# Patient Record
Sex: MALE | Race: WHITE | NOT HISPANIC OR LATINO | Employment: FULL TIME | URBAN - METROPOLITAN AREA
[De-identification: names, ages, dates, MRNs, and addresses within clinical notes are randomized per-mention and may not be internally consistent; named-entity substitution may affect disease eponyms.]

---

## 2017-01-07 ENCOUNTER — COMMUNICATION - HEALTHEAST (OUTPATIENT)
Dept: FAMILY MEDICINE | Facility: CLINIC | Age: 37
End: 2017-01-07

## 2017-01-07 DIAGNOSIS — F41.0 PANIC ATTACK: ICD-10-CM

## 2017-01-16 ENCOUNTER — COMMUNICATION - HEALTHEAST (OUTPATIENT)
Dept: FAMILY MEDICINE | Facility: CLINIC | Age: 37
End: 2017-01-16

## 2017-01-16 DIAGNOSIS — F90.0 ADHD, PREDOMINANTLY INATTENTIVE TYPE: ICD-10-CM

## 2017-02-13 ENCOUNTER — COMMUNICATION - HEALTHEAST (OUTPATIENT)
Dept: FAMILY MEDICINE | Facility: CLINIC | Age: 37
End: 2017-02-13

## 2017-02-13 DIAGNOSIS — F90.0 ADHD, PREDOMINANTLY INATTENTIVE TYPE: ICD-10-CM

## 2017-03-06 ENCOUNTER — COMMUNICATION - HEALTHEAST (OUTPATIENT)
Dept: FAMILY MEDICINE | Facility: CLINIC | Age: 37
End: 2017-03-06

## 2017-03-06 DIAGNOSIS — F41.0 PANIC ATTACK: ICD-10-CM

## 2017-03-08 ENCOUNTER — COMMUNICATION - HEALTHEAST (OUTPATIENT)
Dept: FAMILY MEDICINE | Facility: CLINIC | Age: 37
End: 2017-03-08

## 2017-03-08 DIAGNOSIS — F41.0 PANIC ATTACK: ICD-10-CM

## 2017-03-08 DIAGNOSIS — F90.0 ADHD, PREDOMINANTLY INATTENTIVE TYPE: ICD-10-CM

## 2017-04-03 ENCOUNTER — COMMUNICATION - HEALTHEAST (OUTPATIENT)
Dept: FAMILY MEDICINE | Facility: CLINIC | Age: 37
End: 2017-04-03

## 2017-04-03 DIAGNOSIS — F90.0 ADHD, PREDOMINANTLY INATTENTIVE TYPE: ICD-10-CM

## 2017-04-03 DIAGNOSIS — F41.0 PANIC ATTACK: ICD-10-CM

## 2017-05-05 ENCOUNTER — COMMUNICATION - HEALTHEAST (OUTPATIENT)
Dept: FAMILY MEDICINE | Facility: CLINIC | Age: 37
End: 2017-05-05

## 2017-05-05 DIAGNOSIS — F90.0 ADHD, PREDOMINANTLY INATTENTIVE TYPE: ICD-10-CM

## 2017-05-31 ENCOUNTER — COMMUNICATION - HEALTHEAST (OUTPATIENT)
Dept: FAMILY MEDICINE | Facility: CLINIC | Age: 37
End: 2017-05-31

## 2017-05-31 DIAGNOSIS — F41.0 PANIC ATTACK: ICD-10-CM

## 2017-05-31 DIAGNOSIS — F90.0 ADHD, PREDOMINANTLY INATTENTIVE TYPE: ICD-10-CM

## 2017-06-03 ENCOUNTER — COMMUNICATION - HEALTHEAST (OUTPATIENT)
Dept: SCHEDULING | Facility: CLINIC | Age: 37
End: 2017-06-03

## 2017-06-03 DIAGNOSIS — F41.0 PANIC ATTACK: ICD-10-CM

## 2017-06-06 ENCOUNTER — OFFICE VISIT - HEALTHEAST (OUTPATIENT)
Dept: FAMILY MEDICINE | Facility: CLINIC | Age: 37
End: 2017-06-06

## 2017-06-06 DIAGNOSIS — F90.0 ADHD, PREDOMINANTLY INATTENTIVE TYPE: ICD-10-CM

## 2017-06-06 ASSESSMENT — MIFFLIN-ST. JEOR: SCORE: 1751.8

## 2017-06-14 ENCOUNTER — COMMUNICATION - HEALTHEAST (OUTPATIENT)
Dept: FAMILY MEDICINE | Facility: CLINIC | Age: 37
End: 2017-06-14

## 2017-06-20 ENCOUNTER — AMBULATORY - HEALTHEAST (OUTPATIENT)
Dept: FAMILY MEDICINE | Facility: CLINIC | Age: 37
End: 2017-06-20

## 2017-06-20 DIAGNOSIS — Z79.899 HIGH RISK MEDICATION USE: ICD-10-CM

## 2017-07-03 ENCOUNTER — COMMUNICATION - HEALTHEAST (OUTPATIENT)
Dept: FAMILY MEDICINE | Facility: CLINIC | Age: 37
End: 2017-07-03

## 2017-07-03 DIAGNOSIS — F41.0 PANIC ATTACK: ICD-10-CM

## 2017-08-22 ENCOUNTER — COMMUNICATION - HEALTHEAST (OUTPATIENT)
Dept: FAMILY MEDICINE | Facility: CLINIC | Age: 37
End: 2017-08-22

## 2017-08-22 DIAGNOSIS — J30.9 ALLERGIC RHINITIS: ICD-10-CM

## 2017-09-06 ENCOUNTER — COMMUNICATION - HEALTHEAST (OUTPATIENT)
Dept: SCHEDULING | Facility: CLINIC | Age: 37
End: 2017-09-06

## 2017-09-06 DIAGNOSIS — F90.0 ADHD, PREDOMINANTLY INATTENTIVE TYPE: ICD-10-CM

## 2017-12-12 ENCOUNTER — COMMUNICATION - HEALTHEAST (OUTPATIENT)
Dept: SCHEDULING | Facility: CLINIC | Age: 37
End: 2017-12-12

## 2017-12-12 DIAGNOSIS — F90.0 ADHD, PREDOMINANTLY INATTENTIVE TYPE: ICD-10-CM

## 2018-01-07 ENCOUNTER — COMMUNICATION - HEALTHEAST (OUTPATIENT)
Dept: FAMILY MEDICINE | Facility: CLINIC | Age: 38
End: 2018-01-07

## 2018-01-07 DIAGNOSIS — F90.0 ADHD, PREDOMINANTLY INATTENTIVE TYPE: ICD-10-CM

## 2018-02-06 ENCOUNTER — COMMUNICATION - HEALTHEAST (OUTPATIENT)
Dept: FAMILY MEDICINE | Facility: CLINIC | Age: 38
End: 2018-02-06

## 2018-02-06 DIAGNOSIS — F90.0 ADHD, PREDOMINANTLY INATTENTIVE TYPE: ICD-10-CM

## 2018-03-08 ENCOUNTER — COMMUNICATION - HEALTHEAST (OUTPATIENT)
Dept: FAMILY MEDICINE | Facility: CLINIC | Age: 38
End: 2018-03-08

## 2018-03-08 DIAGNOSIS — J30.9 ALLERGIC RHINITIS: ICD-10-CM

## 2018-03-08 DIAGNOSIS — F90.0 ADHD, PREDOMINANTLY INATTENTIVE TYPE: ICD-10-CM

## 2018-03-12 ENCOUNTER — COMMUNICATION - HEALTHEAST (OUTPATIENT)
Dept: FAMILY MEDICINE | Facility: CLINIC | Age: 38
End: 2018-03-12

## 2018-03-13 ENCOUNTER — COMMUNICATION - HEALTHEAST (OUTPATIENT)
Dept: SCHEDULING | Facility: CLINIC | Age: 38
End: 2018-03-13

## 2018-03-13 DIAGNOSIS — F90.0 ADHD, PREDOMINANTLY INATTENTIVE TYPE: ICD-10-CM

## 2018-03-28 ENCOUNTER — OFFICE VISIT - HEALTHEAST (OUTPATIENT)
Dept: FAMILY MEDICINE | Facility: CLINIC | Age: 38
End: 2018-03-28

## 2018-03-28 DIAGNOSIS — J30.9 ALLERGIC RHINITIS: ICD-10-CM

## 2018-03-28 DIAGNOSIS — F41.9 ANXIETY: ICD-10-CM

## 2018-03-28 DIAGNOSIS — F90.0 ADHD, PREDOMINANTLY INATTENTIVE TYPE: ICD-10-CM

## 2018-03-28 ASSESSMENT — MIFFLIN-ST. JEOR: SCORE: 1783.55

## 2018-04-04 ENCOUNTER — COMMUNICATION - HEALTHEAST (OUTPATIENT)
Dept: FAMILY MEDICINE | Facility: CLINIC | Age: 38
End: 2018-04-04

## 2018-04-04 DIAGNOSIS — J30.9 ALLERGIC RHINITIS: ICD-10-CM

## 2018-04-04 DIAGNOSIS — F90.0 ADHD, PREDOMINANTLY INATTENTIVE TYPE: ICD-10-CM

## 2018-04-05 ENCOUNTER — AMBULATORY - HEALTHEAST (OUTPATIENT)
Dept: FAMILY MEDICINE | Facility: CLINIC | Age: 38
End: 2018-04-05

## 2018-04-05 DIAGNOSIS — J30.9 ALLERGIC RHINITIS: ICD-10-CM

## 2018-05-03 ENCOUNTER — COMMUNICATION - HEALTHEAST (OUTPATIENT)
Dept: FAMILY MEDICINE | Facility: CLINIC | Age: 38
End: 2018-05-03

## 2018-05-03 DIAGNOSIS — F90.0 ADHD, PREDOMINANTLY INATTENTIVE TYPE: ICD-10-CM

## 2018-05-03 DIAGNOSIS — J30.9 ALLERGIC RHINITIS: ICD-10-CM

## 2018-06-06 ENCOUNTER — COMMUNICATION - HEALTHEAST (OUTPATIENT)
Dept: FAMILY MEDICINE | Facility: CLINIC | Age: 38
End: 2018-06-06

## 2018-06-06 DIAGNOSIS — F90.0 ADHD, PREDOMINANTLY INATTENTIVE TYPE: ICD-10-CM

## 2018-06-08 ENCOUNTER — COMMUNICATION - HEALTHEAST (OUTPATIENT)
Dept: FAMILY MEDICINE | Facility: CLINIC | Age: 38
End: 2018-06-08

## 2018-06-08 DIAGNOSIS — F90.0 ADHD, PREDOMINANTLY INATTENTIVE TYPE: ICD-10-CM

## 2018-06-08 DIAGNOSIS — F41.0 PANIC ATTACK: ICD-10-CM

## 2018-07-05 ENCOUNTER — COMMUNICATION - HEALTHEAST (OUTPATIENT)
Dept: FAMILY MEDICINE | Facility: CLINIC | Age: 38
End: 2018-07-05

## 2018-07-05 DIAGNOSIS — F90.0 ADHD, PREDOMINANTLY INATTENTIVE TYPE: ICD-10-CM

## 2018-08-06 ENCOUNTER — COMMUNICATION - HEALTHEAST (OUTPATIENT)
Dept: FAMILY MEDICINE | Facility: CLINIC | Age: 38
End: 2018-08-06

## 2018-08-06 DIAGNOSIS — F90.0 ADHD, PREDOMINANTLY INATTENTIVE TYPE: ICD-10-CM

## 2018-08-31 ENCOUNTER — COMMUNICATION - HEALTHEAST (OUTPATIENT)
Dept: FAMILY MEDICINE | Facility: CLINIC | Age: 38
End: 2018-08-31

## 2018-08-31 DIAGNOSIS — F90.0 ADHD, PREDOMINANTLY INATTENTIVE TYPE: ICD-10-CM

## 2018-08-31 DIAGNOSIS — F41.0 PANIC ATTACK: ICD-10-CM

## 2018-10-03 ENCOUNTER — COMMUNICATION - HEALTHEAST (OUTPATIENT)
Dept: FAMILY MEDICINE | Facility: CLINIC | Age: 38
End: 2018-10-03

## 2018-10-03 DIAGNOSIS — F90.0 ADHD, PREDOMINANTLY INATTENTIVE TYPE: ICD-10-CM

## 2018-11-01 ENCOUNTER — COMMUNICATION - HEALTHEAST (OUTPATIENT)
Dept: FAMILY MEDICINE | Facility: CLINIC | Age: 38
End: 2018-11-01

## 2018-11-01 DIAGNOSIS — F90.0 ADHD, PREDOMINANTLY INATTENTIVE TYPE: ICD-10-CM

## 2018-11-13 ENCOUNTER — COMMUNICATION - HEALTHEAST (OUTPATIENT)
Dept: SCHEDULING | Facility: CLINIC | Age: 38
End: 2018-11-13

## 2018-11-13 ENCOUNTER — COMMUNICATION - HEALTHEAST (OUTPATIENT)
Dept: FAMILY MEDICINE | Facility: CLINIC | Age: 38
End: 2018-11-13

## 2018-11-26 ENCOUNTER — COMMUNICATION - HEALTHEAST (OUTPATIENT)
Dept: FAMILY MEDICINE | Facility: CLINIC | Age: 38
End: 2018-11-26

## 2018-11-26 DIAGNOSIS — F90.0 ADHD, PREDOMINANTLY INATTENTIVE TYPE: ICD-10-CM

## 2018-11-26 DIAGNOSIS — F41.0 PANIC ATTACK: ICD-10-CM

## 2018-12-03 ENCOUNTER — OFFICE VISIT - HEALTHEAST (OUTPATIENT)
Dept: FAMILY MEDICINE | Facility: CLINIC | Age: 38
End: 2018-12-03

## 2018-12-03 ENCOUNTER — COMMUNICATION - HEALTHEAST (OUTPATIENT)
Dept: FAMILY MEDICINE | Facility: CLINIC | Age: 38
End: 2018-12-03

## 2018-12-03 DIAGNOSIS — R03.0 ELEVATED BLOOD PRESSURE READING WITHOUT DIAGNOSIS OF HYPERTENSION: ICD-10-CM

## 2018-12-03 DIAGNOSIS — F90.0 ADHD, PREDOMINANTLY INATTENTIVE TYPE: ICD-10-CM

## 2018-12-03 ASSESSMENT — MIFFLIN-ST. JEOR: SCORE: 1779.01

## 2018-12-05 ENCOUNTER — COMMUNICATION - HEALTHEAST (OUTPATIENT)
Dept: FAMILY MEDICINE | Facility: CLINIC | Age: 38
End: 2018-12-05

## 2018-12-05 ENCOUNTER — AMBULATORY - HEALTHEAST (OUTPATIENT)
Dept: FAMILY MEDICINE | Facility: CLINIC | Age: 38
End: 2018-12-05

## 2018-12-05 DIAGNOSIS — F90.0 ADHD, PREDOMINANTLY INATTENTIVE TYPE: ICD-10-CM

## 2019-01-03 ENCOUNTER — COMMUNICATION - HEALTHEAST (OUTPATIENT)
Dept: FAMILY MEDICINE | Facility: CLINIC | Age: 39
End: 2019-01-03

## 2019-01-03 DIAGNOSIS — F90.0 ADHD, PREDOMINANTLY INATTENTIVE TYPE: ICD-10-CM

## 2019-01-04 ENCOUNTER — COMMUNICATION - HEALTHEAST (OUTPATIENT)
Dept: FAMILY MEDICINE | Facility: CLINIC | Age: 39
End: 2019-01-04

## 2019-01-04 DIAGNOSIS — F90.0 ADHD, PREDOMINANTLY INATTENTIVE TYPE: ICD-10-CM

## 2019-02-01 ENCOUNTER — COMMUNICATION - HEALTHEAST (OUTPATIENT)
Dept: FAMILY MEDICINE | Facility: CLINIC | Age: 39
End: 2019-02-01

## 2019-02-01 DIAGNOSIS — F90.0 ADHD, PREDOMINANTLY INATTENTIVE TYPE: ICD-10-CM

## 2019-02-04 ENCOUNTER — COMMUNICATION - HEALTHEAST (OUTPATIENT)
Dept: FAMILY MEDICINE | Facility: CLINIC | Age: 39
End: 2019-02-04

## 2019-02-04 DIAGNOSIS — F90.0 ADHD, PREDOMINANTLY INATTENTIVE TYPE: ICD-10-CM

## 2019-03-05 ENCOUNTER — COMMUNICATION - HEALTHEAST (OUTPATIENT)
Dept: FAMILY MEDICINE | Facility: CLINIC | Age: 39
End: 2019-03-05

## 2019-03-05 DIAGNOSIS — F41.0 PANIC ATTACK: ICD-10-CM

## 2019-03-05 DIAGNOSIS — F90.0 ADHD, PREDOMINANTLY INATTENTIVE TYPE: ICD-10-CM

## 2019-03-06 ENCOUNTER — COMMUNICATION - HEALTHEAST (OUTPATIENT)
Dept: FAMILY MEDICINE | Facility: CLINIC | Age: 39
End: 2019-03-06

## 2019-03-06 DIAGNOSIS — F90.0 ADHD, PREDOMINANTLY INATTENTIVE TYPE: ICD-10-CM

## 2019-03-06 DIAGNOSIS — F41.0 PANIC ATTACK: ICD-10-CM

## 2019-03-29 ENCOUNTER — OFFICE VISIT - HEALTHEAST (OUTPATIENT)
Dept: FAMILY MEDICINE | Facility: CLINIC | Age: 39
End: 2019-03-29

## 2019-03-29 DIAGNOSIS — F41.0 PANIC ATTACK: ICD-10-CM

## 2019-03-29 DIAGNOSIS — F90.0 ADHD, PREDOMINANTLY INATTENTIVE TYPE: ICD-10-CM

## 2019-03-29 DIAGNOSIS — R03.0 ELEVATED BLOOD PRESSURE READING WITHOUT DIAGNOSIS OF HYPERTENSION: ICD-10-CM

## 2019-03-29 ASSESSMENT — MIFFLIN-ST. JEOR: SCORE: 1773.57

## 2019-04-25 ENCOUNTER — COMMUNICATION - HEALTHEAST (OUTPATIENT)
Dept: FAMILY MEDICINE | Facility: CLINIC | Age: 39
End: 2019-04-25

## 2019-04-25 DIAGNOSIS — F90.0 ADHD, PREDOMINANTLY INATTENTIVE TYPE: ICD-10-CM

## 2019-04-29 ENCOUNTER — COMMUNICATION - HEALTHEAST (OUTPATIENT)
Dept: FAMILY MEDICINE | Facility: CLINIC | Age: 39
End: 2019-04-29

## 2019-05-28 ENCOUNTER — COMMUNICATION - HEALTHEAST (OUTPATIENT)
Dept: FAMILY MEDICINE | Facility: CLINIC | Age: 39
End: 2019-05-28

## 2019-05-28 DIAGNOSIS — F90.0 ADHD, PREDOMINANTLY INATTENTIVE TYPE: ICD-10-CM

## 2019-06-24 ENCOUNTER — COMMUNICATION - HEALTHEAST (OUTPATIENT)
Dept: FAMILY MEDICINE | Facility: CLINIC | Age: 39
End: 2019-06-24

## 2019-06-24 DIAGNOSIS — F90.0 ADHD, PREDOMINANTLY INATTENTIVE TYPE: ICD-10-CM

## 2019-07-24 ENCOUNTER — COMMUNICATION - HEALTHEAST (OUTPATIENT)
Dept: FAMILY MEDICINE | Facility: CLINIC | Age: 39
End: 2019-07-24

## 2019-07-24 DIAGNOSIS — F90.0 ADHD, PREDOMINANTLY INATTENTIVE TYPE: ICD-10-CM

## 2019-07-26 ENCOUNTER — COMMUNICATION - HEALTHEAST (OUTPATIENT)
Dept: FAMILY MEDICINE | Facility: CLINIC | Age: 39
End: 2019-07-26

## 2019-08-21 ENCOUNTER — COMMUNICATION - HEALTHEAST (OUTPATIENT)
Dept: FAMILY MEDICINE | Facility: CLINIC | Age: 39
End: 2019-08-21

## 2019-08-21 DIAGNOSIS — F90.0 ADHD, PREDOMINANTLY INATTENTIVE TYPE: ICD-10-CM

## 2019-09-23 ENCOUNTER — COMMUNICATION - HEALTHEAST (OUTPATIENT)
Dept: FAMILY MEDICINE | Facility: CLINIC | Age: 39
End: 2019-09-23

## 2019-09-23 DIAGNOSIS — F90.0 ADHD, PREDOMINANTLY INATTENTIVE TYPE: ICD-10-CM

## 2019-09-25 ENCOUNTER — COMMUNICATION - HEALTHEAST (OUTPATIENT)
Dept: FAMILY MEDICINE | Facility: CLINIC | Age: 39
End: 2019-09-25

## 2019-10-28 ENCOUNTER — OFFICE VISIT - HEALTHEAST (OUTPATIENT)
Dept: FAMILY MEDICINE | Facility: CLINIC | Age: 39
End: 2019-10-28

## 2019-10-28 DIAGNOSIS — F90.0 ADHD, PREDOMINANTLY INATTENTIVE TYPE: ICD-10-CM

## 2019-10-28 DIAGNOSIS — F41.9 ANXIETY: ICD-10-CM

## 2019-10-28 ASSESSMENT — MIFFLIN-ST. JEOR: SCORE: 1769.94

## 2019-11-07 ENCOUNTER — HEALTH MAINTENANCE LETTER (OUTPATIENT)
Age: 39
End: 2019-11-07

## 2019-11-26 ENCOUNTER — COMMUNICATION - HEALTHEAST (OUTPATIENT)
Dept: FAMILY MEDICINE | Facility: CLINIC | Age: 39
End: 2019-11-26

## 2019-11-26 DIAGNOSIS — F90.0 ADHD, PREDOMINANTLY INATTENTIVE TYPE: ICD-10-CM

## 2019-12-30 ENCOUNTER — COMMUNICATION - HEALTHEAST (OUTPATIENT)
Dept: FAMILY MEDICINE | Facility: CLINIC | Age: 39
End: 2019-12-30

## 2019-12-30 DIAGNOSIS — F90.0 ADHD, PREDOMINANTLY INATTENTIVE TYPE: ICD-10-CM

## 2020-01-27 ENCOUNTER — COMMUNICATION - HEALTHEAST (OUTPATIENT)
Dept: FAMILY MEDICINE | Facility: CLINIC | Age: 40
End: 2020-01-27

## 2020-01-27 DIAGNOSIS — F90.0 ADHD, PREDOMINANTLY INATTENTIVE TYPE: ICD-10-CM

## 2020-02-24 ENCOUNTER — COMMUNICATION - HEALTHEAST (OUTPATIENT)
Dept: FAMILY MEDICINE | Facility: CLINIC | Age: 40
End: 2020-02-24

## 2020-02-24 DIAGNOSIS — F90.0 ADHD, PREDOMINANTLY INATTENTIVE TYPE: ICD-10-CM

## 2020-03-20 ENCOUNTER — COMMUNICATION - HEALTHEAST (OUTPATIENT)
Dept: FAMILY MEDICINE | Facility: CLINIC | Age: 40
End: 2020-03-20

## 2020-03-20 DIAGNOSIS — F90.0 ADHD, PREDOMINANTLY INATTENTIVE TYPE: ICD-10-CM

## 2020-04-02 ENCOUNTER — COMMUNICATION - HEALTHEAST (OUTPATIENT)
Dept: FAMILY MEDICINE | Facility: CLINIC | Age: 40
End: 2020-04-02

## 2020-04-02 DIAGNOSIS — F90.0 ADHD, PREDOMINANTLY INATTENTIVE TYPE: ICD-10-CM

## 2020-05-07 ENCOUNTER — COMMUNICATION - HEALTHEAST (OUTPATIENT)
Dept: FAMILY MEDICINE | Facility: CLINIC | Age: 40
End: 2020-05-07

## 2020-05-07 DIAGNOSIS — F90.0 ADHD, PREDOMINANTLY INATTENTIVE TYPE: ICD-10-CM

## 2020-05-11 ENCOUNTER — OFFICE VISIT - HEALTHEAST (OUTPATIENT)
Dept: FAMILY MEDICINE | Facility: CLINIC | Age: 40
End: 2020-05-11

## 2020-05-11 ENCOUNTER — COMMUNICATION - HEALTHEAST (OUTPATIENT)
Dept: FAMILY MEDICINE | Facility: CLINIC | Age: 40
End: 2020-05-11

## 2020-05-11 DIAGNOSIS — J30.9 ALLERGIC RHINITIS: ICD-10-CM

## 2020-05-11 DIAGNOSIS — F41.9 ANXIETY: ICD-10-CM

## 2020-05-11 DIAGNOSIS — F90.0 ADHD, PREDOMINANTLY INATTENTIVE TYPE: ICD-10-CM

## 2020-06-09 ENCOUNTER — COMMUNICATION - HEALTHEAST (OUTPATIENT)
Dept: FAMILY MEDICINE | Facility: CLINIC | Age: 40
End: 2020-06-09

## 2020-06-09 DIAGNOSIS — F90.0 ADHD, PREDOMINANTLY INATTENTIVE TYPE: ICD-10-CM

## 2020-07-10 ENCOUNTER — COMMUNICATION - HEALTHEAST (OUTPATIENT)
Dept: FAMILY MEDICINE | Facility: CLINIC | Age: 40
End: 2020-07-10

## 2020-07-10 DIAGNOSIS — F90.0 ADHD, PREDOMINANTLY INATTENTIVE TYPE: ICD-10-CM

## 2020-08-13 ENCOUNTER — COMMUNICATION - HEALTHEAST (OUTPATIENT)
Dept: FAMILY MEDICINE | Facility: CLINIC | Age: 40
End: 2020-08-13

## 2020-08-13 DIAGNOSIS — F90.0 ADHD, PREDOMINANTLY INATTENTIVE TYPE: ICD-10-CM

## 2020-09-14 ENCOUNTER — COMMUNICATION - HEALTHEAST (OUTPATIENT)
Dept: FAMILY MEDICINE | Facility: CLINIC | Age: 40
End: 2020-09-14

## 2020-09-14 DIAGNOSIS — F90.0 ADHD, PREDOMINANTLY INATTENTIVE TYPE: ICD-10-CM

## 2020-10-19 ENCOUNTER — COMMUNICATION - HEALTHEAST (OUTPATIENT)
Dept: FAMILY MEDICINE | Facility: CLINIC | Age: 40
End: 2020-10-19

## 2020-10-19 DIAGNOSIS — F90.0 ADHD, PREDOMINANTLY INATTENTIVE TYPE: ICD-10-CM

## 2020-11-16 ENCOUNTER — COMMUNICATION - HEALTHEAST (OUTPATIENT)
Dept: FAMILY MEDICINE | Facility: CLINIC | Age: 40
End: 2020-11-16

## 2020-11-16 DIAGNOSIS — F90.0 ADHD, PREDOMINANTLY INATTENTIVE TYPE: ICD-10-CM

## 2020-11-29 ENCOUNTER — HEALTH MAINTENANCE LETTER (OUTPATIENT)
Age: 40
End: 2020-11-29

## 2020-12-30 ENCOUNTER — COMMUNICATION - HEALTHEAST (OUTPATIENT)
Dept: FAMILY MEDICINE | Facility: CLINIC | Age: 40
End: 2020-12-30

## 2020-12-30 DIAGNOSIS — F90.0 ADHD, PREDOMINANTLY INATTENTIVE TYPE: ICD-10-CM

## 2021-01-04 ENCOUNTER — OFFICE VISIT - HEALTHEAST (OUTPATIENT)
Dept: FAMILY MEDICINE | Facility: CLINIC | Age: 41
End: 2021-01-04

## 2021-01-04 DIAGNOSIS — I10 ESSENTIAL HYPERTENSION: ICD-10-CM

## 2021-01-04 DIAGNOSIS — F90.0 ADHD, PREDOMINANTLY INATTENTIVE TYPE: ICD-10-CM

## 2021-01-04 DIAGNOSIS — G25.0 BENIGN FAMILIAL TREMOR: ICD-10-CM

## 2021-01-04 ASSESSMENT — MIFFLIN-ST. JEOR: SCORE: 1774.48

## 2021-01-30 ENCOUNTER — COMMUNICATION - HEALTHEAST (OUTPATIENT)
Dept: FAMILY MEDICINE | Facility: CLINIC | Age: 41
End: 2021-01-30

## 2021-01-30 DIAGNOSIS — I10 ESSENTIAL HYPERTENSION: ICD-10-CM

## 2021-04-05 ENCOUNTER — COMMUNICATION - HEALTHEAST (OUTPATIENT)
Dept: FAMILY MEDICINE | Facility: CLINIC | Age: 41
End: 2021-04-05

## 2021-04-05 DIAGNOSIS — F90.0 ADHD, PREDOMINANTLY INATTENTIVE TYPE: ICD-10-CM

## 2021-05-27 NOTE — PATIENT INSTRUCTIONS - HE
Check blood pressure outside clinic and call with 5-10 readings in a few weeks.    DASH diet    Consider magnesium / co-q 10 supplements    Consider low sodium diet    Limit alcohol    Return for fasting labs

## 2021-05-27 NOTE — PROGRESS NOTES
"SUBJECTIVE: Claudio Thacker is a 38 y.o. male with:  Chief Complaint   Patient presents with     Medication check    1) ADD- He is taking Adderall XR 20 mg daily.  Helps with concentration at work.  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  Likes his job but very busy.  Takes med daily.  No side effects.  No loss appetite/ sleep issues.  Would like to remain on this med.    2) Anxiety/ panic- No panic attacks for awhile.  His overall anxiety is well controlled on Prozac.  No side effects on med.  He had premature son born in last year so that was stressful but he did not worry a lot.  Feels mood is doing well.  No depression.  Started running recently.    3) Elevated blood pressure- BP has been up off and on but always comes back down.  Has FH HTN.  He saw Dr. Peralta in December and had normal BP after high reading during episode sinusitis.  He feels his diet could be better.    SH:  with 2 children.  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    Patient Active Problem List   Diagnosis     Esophageal reflux     Allergic Rhinitis     ADHD, predominantly inattentive type     Panic attack     Anxiety     Benign familial tremor     Controlled substance agreement signed      OBJECTIVE: BP (!) 150/94   Pulse 86   Ht 5' 10\" (1.778 m)   Wt 186 lb 12.8 oz (84.7 kg)   BMI 26.80 kg/m   no distress  PSYCH:  Awake, alert, and oriented x 3.  Mood and affect are appropriate.  Good eye contact.  Speech is normal.  No suicidal ideation.  No hallucinations.  Neuro: Normal strength and tone.  Normal gait.      BP Readings from Last 3 Encounters:   03/29/19 140/90   12/03/18 128/80   03/28/18 134/86     Wt Readings from Last 3 Encounters:   03/29/19 186 lb 12.8 oz (84.7 kg)   12/03/18 188 lb (85.3 kg)   03/28/18 189 lb (85.7 kg)     Claudio was seen today for medication check.    Diagnoses and all orders for this visit:    Elevated blood pressure reading without diagnosis of hypertension- Will get outside BP readings- he " plans to buy a home BP cuff.  Trial of supplemetns/ diet/ lifestyle changes first but if consistently elevated despite that, start anti-hypertensive.  Check labs.  -     Lipid Cascade; Future  -     Comprehensive Metabolic Panel; Future  -     Magnesium, Red Blood Cell; Future    ADHD, predominantly inattentive type- Controlled on stimulant.  Controlled substance contract signed.  F/U in 6 months.  -     dextroamphetamine-amphetamine (ADDERALL XR) 20 MG 24 hr capsule; Take 1 capsule (20 mg total) by mouth every morning.    Panic attack- Stable on SSRI.  Continue medication for now.    -     FLUoxetine (PROZAC) 20 MG capsule; Take 1 capsule (20 mg total) by mouth daily.       Patient Instructions   Check blood pressure outside clinic and call with 5-10 readings in a few weeks.    DASH diet    Consider magnesium / co-q 10 supplements    Consider low sodium diet    Limit alcohol    Return for fasting labs       Catarina Mcfadden

## 2021-05-28 NOTE — TELEPHONE ENCOUNTER
Medication: dextroamphetamine-amphetamine (ADDERALL XR) 20 MG 24 hr capsule  Pharmacy: [facility]   Last OV: 03/29/19   Last Refill: 03/29/19 Q:30  Refills: 0    Please advise on refill.     KIRBY/JIHAN

## 2021-05-28 NOTE — TELEPHONE ENCOUNTER
Controlled Substance Refill Request  Medication:   Requested Prescriptions     Pending Prescriptions Disp Refills     dextroamphetamine-amphetamine (ADDERALL XR) 20 MG 24 hr capsule 30 capsule 0     Sig: Take 1 capsule (20 mg total) by mouth every morning.     Date Last Fill: 3/29/2019.  Pharmacy: [facility]  Submit electronically to pharmacy  Controlled Substance Agreement on File:   Encounter-Level CSA Scan Date - 03/28/2018:    Scan on 4/2/2018 11:14 AM: ADDERALL (below)             Encounter-Level CSA Scan Date - 04/25/2016:    Scan on 4/27/2016  8:07 AM: Adderall (below)         Last office visit: 3/29/2019. Last office visit pertaining to requested medication was 3/29/2019 with PCP Dr APARNA Mcfadden

## 2021-05-29 NOTE — TELEPHONE ENCOUNTER
Controlled Substance Refill Request  Medication:   Requested Prescriptions     Pending Prescriptions Disp Refills     dextroamphetamine-amphetamine (ADDERALL XR) 20 MG 24 hr capsule 30 capsule 0     Sig: Take 1 capsule (20 mg total) by mouth every morning.     Date Last Fill: 4/29/19  Pharmacy: [id number]   Submit electronically to pharmacy  Controlled Substance Agreement on File:   Encounter-Level CSA Scan Date - 03/28/2018:    Scan on 4/2/2018 11:14 AM: ADDERALL (below)             Encounter-Level CSA Scan Date - 04/25/2016:    Scan on 4/27/2016  8:07 AM: Adderall (below)         Last office visit: Last office visit pertaining to requested medication was 3/29/19.

## 2021-05-29 NOTE — TELEPHONE ENCOUNTER
Medication: dextroamphetamine-amphetamine (ADDERALL XR) 20 MG 24 hr capsule  Pharmacy: [facility], [place]   Last OV: 03/29/19  Last Refill: 04/29/19 Q:30 Refills: 0    Please advise on refill.     KIRBY/MAGDY

## 2021-05-30 NOTE — TELEPHONE ENCOUNTER
Medication: dextroamphetamine-amphetamine (ADDERALL XR) 20 MG 24 hr capsule  Pharmacy: [facility]   Last OV: 03/29/19   Last Refill: 05/29/19 Q: 30 Refills: 0     Please advise on refill.     KIRBY/MAGDY

## 2021-05-30 NOTE — TELEPHONE ENCOUNTER
Controlled Substance Refill Request  Medication:   Requested Prescriptions     Pending Prescriptions Disp Refills     dextroamphetamine-amphetamine (ADDERALL XR) 20 MG 24 hr capsule 30 capsule 0     Sig: Take 1 capsule (20 mg total) by mouth every morning.     Date Last Fill: 6/26/19  Pharmacy: [id number]   Submit electronically to pharmacy  Controlled Substance Agreement on File:   Encounter-Level CSA Scan Date - 03/28/2018:    Scan on 4/2/2018 11:14 AM: ADDERALL (below)             Encounter-Level CSA Scan Date - 04/25/2016:    Scan on 4/27/2016  8:07 AM: Adderall (below)         Last office visit: Last office visit pertaining to requested medication was 3/29/19.

## 2021-05-30 NOTE — TELEPHONE ENCOUNTER
Controlled Substance Refill Request  Medication:   Requested Prescriptions     Pending Prescriptions Disp Refills     dextroamphetamine-amphetamine (ADDERALL XR) 20 MG 24 hr capsule 30 capsule 0     Sig: Take 1 capsule (20 mg total) by mouth every morning.     Date Last Fill: 5/29/19  Pharmacy: [id number]   Submit electronically to pharmacy  Controlled Substance Agreement on File:   Encounter-Level CSA Scan Date - 03/28/2018:    Scan on 4/2/2018 11:14 AM: ADDERALL (below)             Encounter-Level CSA Scan Date - 04/25/2016:    Scan on 4/27/2016  8:07 AM: Adderall (below)         Last office visit: Last office visit pertaining to requested medication was 3/29/19.

## 2021-05-30 NOTE — TELEPHONE ENCOUNTER
Patient  is going out of town tomorrow requesting to process as soon as possible.   Who is calling:  Patient   Reason for Call:  Follow up on below refill medication . Patient states that he is going out of town tomorrow requesting to process as soon as possible.   Date of last appointment with primary care: 3/29/19  Okay to leave a detailed message: No

## 2021-05-31 VITALS — BODY MASS INDEX: 26.05 KG/M2 | WEIGHT: 182 LBS | HEIGHT: 70 IN

## 2021-05-31 NOTE — TELEPHONE ENCOUNTER
Controlled Substance Refill Request  Medication:   Requested Prescriptions     Pending Prescriptions Disp Refills     dextroamphetamine-amphetamine (ADDERALL XR) 20 MG 24 hr capsule 30 capsule 0     Sig: Take 1 capsule (20 mg total) by mouth every morning.     Date Last Fill: 7/26/19  Ph[facility]: [facility]   Submit electronically to pharmacy  Controlled Substance Agreement on File:   Encounter-Level CSA Scan Date - 03/28/2018:    Scan on 4/2/2018 11:14 AM: ADDERALL (below)             Encounter-Level CSA Scan Date - 04/25/2016:    Scan on 4/27/2016  8:07 AM: Adderall (below)         Last office visit: 3/29/2019 Catarina Mcfadden MD

## 2021-05-31 NOTE — TELEPHONE ENCOUNTER
Medication:dextroamphetamine-amphetamine (ADDERALL XR) 20 MG 24 hr capsule  Pharmacy: [id number] - [place], [place]   Last OV: 3/29/19 with Dr. Mcfadden  Next OV: None  Last Refill: 7/26/19, #30 with 0 refills authorized by Dr. Mcfadden.     Please advise.     JIHAN GRAY

## 2021-06-01 VITALS — BODY MASS INDEX: 27.06 KG/M2 | HEIGHT: 70 IN | WEIGHT: 189 LBS

## 2021-06-01 NOTE — TELEPHONE ENCOUNTER
LVM for patient informing him that rx has been approved however we are currently experiencing software issues where we cannot send the medication electronically to his pharmacy. Therefore patient needs to come into the office to  the script.   Script has been placed at the  for him to .    KIRBY/MAGDY

## 2021-06-01 NOTE — TELEPHONE ENCOUNTER
Medication: dextroamphetamine-amphetamine (ADDERALL XR) 20 MG 24 hr capsule  Pharmacy: [facility], [place]   Last OV: 03/29/19   Last Refill: 08/23/19 Q:30  Refills: 0       Please advise on refill.     KIRBY/MAGDY

## 2021-06-01 NOTE — TELEPHONE ENCOUNTER
Controlled Substance Refill Request  Medication:   Requested Prescriptions     Pending Prescriptions Disp Refills     dextroamphetamine-amphetamine (ADDERALL XR) 20 MG 24 hr capsule 30 capsule 0     Sig: Take 1 capsule (20 mg total) by mouth every morning.     Date Last Fill: 8/23/19  Pharmacy: [id number]   Submit electronically to pharmacy  Controlled Substance Agreement on File:   Encounter-Level CSA Scan Date - 03/28/2018:    Scan on 4/2/2018 11:14 AM: ADDERALL           Encounter-Level CSA Scan Date - 04/25/2016:    Scan on 4/27/2016  8:07 AM: Adderall       Last office visit: Last office visit pertaining to requested medication was 3/29/19.

## 2021-06-02 VITALS — WEIGHT: 188 LBS | BODY MASS INDEX: 26.92 KG/M2 | HEIGHT: 70 IN

## 2021-06-02 VITALS — WEIGHT: 186.8 LBS | HEIGHT: 70 IN | BODY MASS INDEX: 26.74 KG/M2

## 2021-06-02 NOTE — PROGRESS NOTES
"SUBJECTIVE: Claudio Thacker is a 39 y.o. male with:  Chief Complaint   Patient presents with     Follow-up     Medication Refill     adderall    1) ADD- He continues on Adderall XR 20 mg daily.  Works well for his concentration. No side effects.  Sleeping well at night.  Would like to continue on current dose. 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  Likes his new job- not too stressful.  Sometimes does not take Adderall on weekends but takes daily during the work week.    2) Anxiety / panic - Started Prozac years ago after birth of first child when he had lot of anxiety.  Lately he feels could be causing a side effect - he feels funny when he has a little wine.  Has not had this issue previously.  Would like to try going down on Prozac.  Denies any issues with anxiety/ panic attacks.  His second child is now 3 yo.    OBJECTIVE: /82 (Patient Site: Left Arm, Patient Position: Sitting, Cuff Size: Adult Regular)   Pulse 85   Ht 5' 10\" (1.778 m)   Wt 186 lb (84.4 kg)   SpO2 99%   BMI 26.69 kg/m   no distress  PSYCH:  Awake, alert, and oriented x 3.  Mood and affect are appropriate.  Good eye contact.  Speech is normal.  No suicidal ideation.  No hallucinations.    Claudio was seen today for follow-up and medication refill.    Diagnoses and all orders for this visit:    ADHD, predominantly inattentive type- Well controlled.  F/U in 6 months. Continue on stimulant.  -     dextroamphetamine-amphetamine (ADDERALL XR) 20 MG 24 hr capsule; Take 1 capsule (20 mg total) by mouth every morning.    Anxiety- Well controlled.  Will try weaning Prozac.  Cut down to 10 mg daily for 2 months then stop.  Update me by My Chart message at that time.  -     FLUoxetine (PROZAC) 10 MG capsule; Take 1 capsule (10 mg total) by mouth daily.     Catarina Mcfadden   "

## 2021-06-03 VITALS
HEIGHT: 70 IN | BODY MASS INDEX: 26.63 KG/M2 | SYSTOLIC BLOOD PRESSURE: 142 MMHG | WEIGHT: 186 LBS | OXYGEN SATURATION: 99 % | HEART RATE: 85 BPM | DIASTOLIC BLOOD PRESSURE: 82 MMHG

## 2021-06-03 NOTE — TELEPHONE ENCOUNTER
Controlled Substance Refill Request  Medication:   Requested Prescriptions     Pending Prescriptions Disp Refills     dextroamphetamine-amphetamine (ADDERALL XR) 20 MG 24 hr capsule 30 capsule 0     Sig: Take 1 capsule (20 mg total) by mouth every morning.     Date Last Fill: 10/28/19  Ph[facility]acy[facility]   Submit electronically to pharmacy  Controlled Substance Agreement on File:   Encounter-Level CSA Scan Date - 03/28/2018:    Scan on 4/2/2018 11:14 AM: ADDERALL           Encounter-Level CSA Scan Date - 04/25/2016:    Scan on 4/27/2016  8:07 AM: Adderall       Last office visit: Last office visit pertaining to requested medication was 10/28/19.

## 2021-06-04 NOTE — TELEPHONE ENCOUNTER
Controlled Substance Refill Request  Medication:   Requested Prescriptions     Pending Prescriptions Disp Refills     dextroamphetamine-amphetamine (ADDERALL XR) 20 MG 24 hr capsule 30 capsule 0     Sig: Take 1 capsule (20 mg total) by mouth every morning.     Date Last Fill: 11/27/19  Pharmacy: [id number]   Submit electronically to pharmacy  Controlled Substance Agreement on File:   Encounter-Level CSA Scan Date - 03/28/2018:    Scan on 4/2/2018 11:14 AM: ADDERALL           Encounter-Level CSA Scan Date - 04/25/2016:    Scan on 4/27/2016  8:07 AM: Adderall       Last office visit: Last office visit pertaining to requested medication was 10/28/19.

## 2021-06-05 VITALS
BODY MASS INDEX: 26.77 KG/M2 | WEIGHT: 187 LBS | HEIGHT: 70 IN | HEART RATE: 89 BPM | SYSTOLIC BLOOD PRESSURE: 160 MMHG | OXYGEN SATURATION: 97 % | DIASTOLIC BLOOD PRESSURE: 100 MMHG

## 2021-06-05 NOTE — TELEPHONE ENCOUNTER
Controlled Substance Refill Request  Medication Name:   Requested Prescriptions     Pending Prescriptions Disp Refills     dextroamphetamine-amphetamine (ADDERALL XR) 20 MG 24 hr capsule 30 capsule 0     Sig: Take 1 capsule (20 mg total) by mouth every morning.     Date Last Fill: 1/3/20  [facility]ed [facility]  Submit electronically to pharmacy  Controlled Substance Agreement on file:   Encounter-Level CSA Scan Date - 03/28/2018:    Scan on 4/2/2018 11:14 AM: ADDERALL           Encounter-Level CSA Scan Date - 04/25/2016:    Scan on 4/27/2016  8:07 AM: Adderall        Last office visit:  10/28/19

## 2021-06-06 NOTE — TELEPHONE ENCOUNTER
Controlled Substance Refill Request  Medication Name:   Requested Prescriptions     Pending Prescriptions Disp Refills     dextroamphetamine-amphetamine (ADDERALL XR) 20 MG 24 hr capsule 30 capsule 0     Sig: Take 1 capsule (20 mg total) by mouth every morning.     Date Last Fill: 1/28/20  [facility]ed [facility]  Submit electronically to pharmacy  Controlled Substance Agreement on file:   Encounter-Level CSA Scan Date - 03/28/2018:    Scan on 4/2/2018 11:14 AM: ADDERALL           Encounter-Level CSA Scan Date - 04/25/2016:    Scan on 4/27/2016  8:07 AM: Adderall        Last office visit:  10/28/19

## 2021-06-07 NOTE — TELEPHONE ENCOUNTER
Controlled Substance Refill Request  Medication Name:   Requested Prescriptions     Pending Prescriptions Disp Refills     dextroamphetamine-amphetamine (ADDERALL XR) 20 MG 24 hr capsule 30 capsule 0     Sig: Take 1 capsule (20 mg total) by mouth every morning.     Date Last Fill: 3/23/20  [facility]ed [facility]  Submit electronically to pharmacy  Controlled Substance Agreement on file:   Encounter-Level CSA Scan Date - 03/28/2018:    Scan on 4/2/2018 11:14 AM: ADDERALL           Encounter-Level CSA Scan Date - 04/25/2016:    Scan on 4/27/2016  8:07 AM: Adderall        Last office visit:  10/28/19

## 2021-06-07 NOTE — TELEPHONE ENCOUNTER
RN cannot approve Refill Request    RN can NOT refill this medication med is not covered by policy/route to provider. Last office visit: 10/28/2019 Catarina Mcfadden MD Last Physical: Visit date not found Last MTM visit: Visit date not found Last visit same specialty: 10/28/2019 Catarina Mcfadden MD.  Next visit within 3 mo: Visit date not found  Next physical within 3 mo: Visit date not found      Mandy Suarez, [facility] [place] Connection Triage/Med Refill 3/20/2020    Requested Prescriptions   Pending Prescriptions Disp Refills     dextroamphetamine-amphetamine (ADDERALL XR) 20 MG 24 hr capsule 30 capsule 0     Sig: Take 1 capsule (20 mg total) by mouth every morning.       Controlled Substances Refill Protocol Failed - 3/20/2020  1:12 PM        Failed - Route all Controlled Substance Requests to Provider        Passed - Patient has controlled substance agreement in past 12 months     Encounter-Level CSA Scan Date - 03/28/2018:    Scan on 4/2/2018 11:14 AM: ADDERALL           Encounter-Level CSA Scan Date - 04/25/2016:    Scan on 4/27/2016  8:07 AM: Adderall               Passed - Visit with PCP or prescribing provider visit in past 12 months      Last office visit with prescriber/PCP: 10/28/2019 Catarina Mcfadden MD OR same dept: 10/28/2019 Catarina Mcfadden MD OR same specialty: 10/28/2019 Catarina Mcfadden MD Last physical: Visit date not found Last MTM visit: Visit date not found    Next visit within 3 mo: Visit date not found  Next physical within 3 mo: Visit date not found  Prescriber OR PCP: Catarina Mcfadden MD  Last diagnosis associated with med order: 1. ADHD, predominantly inattentive type  - dextroamphetamine-amphetamine (ADDERALL XR) 20 MG 24 hr capsule; Take 1 capsule (20 mg total) by mouth every morning.  Dispense: 30 capsule; Refill: 0

## 2021-06-08 NOTE — PROGRESS NOTES
"Claudio Thacker is a 39 y.o. male who is being evaluated via a billable telephone visit.      The patient has been notified of following:     \"This telephone visit will be conducted via a call between you and your physician/provider. We have found that certain health care needs can be provided without the need for a physical exam.  This service lets us provide the care you need with a short phone conversation.  If a prescription is necessary we can send it directly to your pharmacy.  If lab work is needed we can place an order for that and you can then stop by our lab to have the test done at a later time.    Telephone visits are billed at different rates depending on your insurance coverage. During this emergency period, for some insurers they may be billed the same as an in-person visit.  Please reach out to your insurance provider with any questions.    If during the course of the call the physician/provider feels a telephone visit is not appropriate, you will not be charged for this service.\"    Patient has given verbal consent to a Telephone visit? Yes    What phone number would you like to be contacted at? [phone]    Patient would like to receive their AVS by AVS Preference: Cesia.     SUBJECTIVE: Claudio Thacker is a 39 y.o. male with:  Chief Complaint   Patient presents with     Medication check    Pt has h/o of ADD and is taking Adderall XR 20 mg daily.  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  No side effects.  Also helping to watch his children.    Anxiety/ panic disorder - He was able to wean completely off the Prozac in the past year.  He feels more internal dialogue in his head but does not bother him.  No worsening of anxiety with the pandemic.  Has been taking better care of his health.    Has history of allergies and would like to have Singulair refilled.  Has not taken it in awhile but worked well in past.    SH: " .  Was laid off from work.  He is walking every day for 1 hour.  Has lost weight and is down to 176 lb.  His children are home now so doing some caregiving. He is doing a lot of cooking.    Patient Active Problem List   Diagnosis     Esophageal reflux     Allergic Rhinitis     ADHD, predominantly inattentive type     Panic attack     Anxiety     Benign familial tremor     Controlled substance agreement signed        BP Readings from Last 3 Encounters:   10/28/19 142/82   03/29/19 (!) 150/94   12/03/18 128/80         Wt Readings from Last 3 Encounters:   10/28/19 186 lb (84.4 kg)   03/29/19 186 lb 12.8 oz (84.7 kg)   12/03/18 188 lb (85.3 kg)       Assessment/Plan:  1. ADHD, predominantly inattentive type  Controlled.  Will send out controlled substance contract for him to sign and return.  F/U for PE in 6 months.  - dextroamphetamine-amphetamine (ADDERALL XR) 20 MG 24 hr capsule; Take 1 capsule (20 mg total) by mouth every morning.  Dispense: 30 capsule; Refill: 0    2. Anxiety  Improved off meds with lifestyle changes.    3. Allergic rhinitis  Will refill Singulair.  - montelukast (SINGULAIR) 10 mg tablet; Take 1 tablet (10 mg total) by mouth at bedtime.  Dispense: 90 tablet; Refill: 3        Phone call duration:  15 minutes    JIHAN Atwood

## 2021-06-08 NOTE — TELEPHONE ENCOUNTER
Pt's informed. Spoke with pharmacy they are out of stock on this medication and will get shipment next Wednesday and e-mail pt when it's for .

## 2021-06-08 NOTE — TELEPHONE ENCOUNTER
Controlled Substance Refill Request  Medication Name:   Requested Prescriptions     Pending Prescriptions Disp Refills     dextroamphetamine-amphetamine (ADDERALL XR) 20 MG 24 hr capsule 30 capsule 0     Sig: Take 1 capsule (20 mg total) by mouth every morning.     Date Last Fill: 4/6/20  [facility]ed [facility]  Submit electronically to pharmacy  Controlled Substance Agreement on file:   Encounter-Level CSA Scan Date - 03/28/2018:    Scan on 4/2/2018 11:14 AM: ADDERALL           Encounter-Level CSA Scan Date - 04/25/2016:    Scan on 4/27/2016  8:07 AM: Adderall        Last office visit:  10/28/19

## 2021-06-08 NOTE — TELEPHONE ENCOUNTER
Controlled Substance Refill Request  Medication Name:   Requested Prescriptions     Pending Prescriptions Disp Refills     dextroamphetamine-amphetamine (ADDERALL XR) 20 MG 24 hr capsule 30 capsule 0     Sig: Take 1 capsule (20 mg total) by mouth every morning.     Date Last Fill: 5/11/20  [facility]ed [facility]  Submit electronically to pharmacy  Controlled Substance Agreement on file:   Encounter-Level CSA Scan Date - 03/28/2018:    Scan on 4/2/2018 11:14 AM: ADDERALL           Encounter-Level CSA Scan Date - 04/25/2016:    Scan on 4/27/2016  8:07 AM: Adderall        Last office visit:  5/11/20

## 2021-06-09 NOTE — TELEPHONE ENCOUNTER
Controlled Substance Refill Request  Medication Name:   Requested Prescriptions     Pending Prescriptions Disp Refills     dextroamphetamine-amphetamine (ADDERALL XR) 20 MG 24 hr capsule 30 capsule 0     Sig: Take 1 capsule (20 mg total) by mouth every morning.     Date Last Fill: 6/10/20  [facility]ed [facility]  Submit electronically to pharmacy  Controlled Substance Agreement on file:   Encounter-Level CSA Scan Date - 03/28/2018:    Scan on 4/2/2018 11:14 AM: ADDERALL           Encounter-Level CSA Scan Date - 04/25/2016:    Scan on 4/27/2016  8:07 AM: Adderall        Last office visit:  5/11/20

## 2021-06-10 NOTE — TELEPHONE ENCOUNTER
Controlled Substance Refill Request  Medication Name:   Requested Prescriptions     Pending Prescriptions Disp Refills     dextroamphetamine-amphetamine (ADDERALL XR) 20 MG 24 hr capsule 30 capsule 0     Sig: Take 1 capsule (20 mg total) by mouth every morning.     Date Last Fill: 7/13/20  [facility]ed [facility]  Submit electronically to pharmacy  Controlled Substance Agreement on file:   Encounter-Level CSA Scan Date - 03/28/2018:    Scan on 4/2/2018 11:14 AM: ADDERALL           Encounter-Level CSA Scan Date - 04/25/2016:    Scan on 4/27/2016  8:07 AM: Adderall        Last office visit:  5/11/20

## 2021-06-11 NOTE — TELEPHONE ENCOUNTER
Controlled Substance Refill Request  Medication Name:   Requested Prescriptions     Pending Prescriptions Disp Refills     dextroamphetamine-amphetamine (ADDERALL XR) 20 MG 24 hr capsule 30 capsule 0     Sig: Take 1 capsule (20 mg total) by mouth every morning.     Date Last Fill: 8/13/20  [facility]ed [facility]  Submit electronically to pharmacy  Controlled Substance Agreement on file:   Encounter-Level CSA Scan Date - 03/28/2018:    Scan on 4/2/2018 11:14 AM: ADDERALL           Encounter-Level CSA Scan Date - 04/25/2016:    Scan on 4/27/2016  8:07 AM: Adderall        Last office visit:  5/11/20    .

## 2021-06-11 NOTE — PROGRESS NOTES
"OFFICE VISIT - FAMILY MEDICINE     ASSESSMENT AND PLAN     1. ADHD, predominantly inattentive type  Pain Clinic Survey, Urine    dextroamphetamine-amphetamine (ADDERALL XR) 20 MG 24 hr capsule    dextroamphetamine-amphetamine (ADDERALL XR) 20 MG 24 hr capsule    dextroamphetamine-amphetamine (ADDERALL XR) 20 MG 24 hr capsule     ADHD appeared overall control with current Adderall 20 mg dose, no side effect of misuse.  Continue current treatment check a urine drug screen as part of management of the medication.  Anxiety appears stable with paroxetine 20 mg daily, could consider taper medication down as symptoms continue to improve.  Seasonal allergies currently controlled with Singulair, Flonase, he could add over-the-counter Xyzal 5 mg at night.  CHIEF COMPLAINT   Follow-up (Adderal)    HPI   Claudio Thacker is a 36 y.o. male.  Past history of ADHD diagnosed in childhood, anxiety with panic attacks, benign familial tremor, GERD, and allergic rhinitis.    CONCENTRATION  Diagnosed with ADHD as young child, at [facility], now part of [facility]. Previously on Adderal XR up through 2001. Initially on Ritalin, and all medications he tried helped.  Taking Adderall XR 20 mg daily, as he has been in the stool for almost couple of years now, has been responded well, is able to focus at work, complete his task.  Denies any side effects like insomnia, hypertension, chest pain nor lack of appetite.  Does have mild anxiety,that has been  stable with paroxetine, and the birth of his son.  Seasonal allergies seem to be flaring up lately, has been taking Singulair, Flonase, symptoms are not optimally controlled.  He is interested in adding medication like Zyrtec or Xyzal.    Review of Systems As per HPI, otherwise negative.    OBJECTIVE   /80 (Patient Site: Right Arm, Patient Position: Sitting, Cuff Size: Adult Large)  Pulse 76  Ht 5' 10\" (1.778 m)  Wt 182 lb (82.6 kg)  BMI 26.11 kg/m2  Physical Exam "   Constitutional: He appears well-developed and well-nourished.   HENT:   Head: Normocephalic and atraumatic.   Neck: Normal range of motion. Neck supple. No tracheal deviation present. No thyromegaly present.   Cardiovascular: Normal rate, regular rhythm, normal heart sounds and intact distal pulses.  Exam reveals no gallop and no friction rub.    No murmur heard.  Pulmonary/Chest: Effort normal and breath sounds normal. No respiratory distress. He has no wheezes. He has no rales.   Psychiatric: He has a normal mood and affect. Judgment and thought content normal.       [facility]   No family history on file.  Social History     Social History     Marital status: Single     Spouse name: N/A     Number of children: N/A     Years of education: N/A     Occupational History     Not on file.     Social History Main Topics     Smoking status: Never Smoker     Smokeless tobacco: Former User     Alcohol use Yes     Drug use: No     Sexual activity: Yes     Partners: Female     Birth control/ protection: None     Other Topics Concern     Not on file     Social History Narrative       [facility]     Patient Active Problem List    Diagnosis Date Noted     Controlled substance agreement signed 04/25/2016     Overview Note:     4/25/2016       ADHD, predominantly inattentive type 03/21/2016     Panic attack 03/21/2016     Anxiety 03/21/2016     Benign familial tremor 03/21/2016     Esophageal reflux      Overview Note:     Controlled with prn omeprazole.       Allergic Rhinitis      Overview Note:     Diagnosed in childhood, at [facility].  Records not available.  Replacement Utility updated for latest IMO load       No past surgical history on file.    RESULTS/CONSULTS (Lab/Rad)   No results found for this or any previous visit (from the past 168 hour(s)).  No results found.    HEALTH MAINTENANCE / SCREENING   PHQ-2 Total Score: 0 (6/6/2017 10:00 AM), No Data Recorded,No Data Recorded  Immunization History   Administered  Date(s) Administered     Hep A, historic 01/01/2001, 06/01/2001     Tdap 01/01/2008     Health Maintenance   Topic     ADVANCE DIRECTIVES DISCUSSED WITH PATIENT      TD 18+ HE      INFLUENZA VACCINE RULE BASED (Season Ended)     TDAP ADULT ONE TIME DOSE      I have had an Advance Directives discussion with the patient.  The following are part of a depression follow up plan for the patient:  emotional support assessment, emotional support education, emotional support management and under care of mental health team  Inocencio Peralta MD  Family Medicine, [facility]   This transcription uses voice recognition software, which may contain typographical errors.

## 2021-06-12 NOTE — TELEPHONE ENCOUNTER
Controlled Substance Refill Request  Medication Name:   Requested Prescriptions     Pending Prescriptions Disp Refills     dextroamphetamine-amphetamine (ADDERALL XR) 20 MG 24 hr capsule 30 capsule 0     Sig: Take 1 capsule (20 mg total) by mouth every morning.     Date Last Fill: 9/16/20  [facility]  Submit electronically to pharmacy  Controlled Substance Agreement on file:   Encounter-Level CSA Scan Date - 03/28/2018:    Scan on 4/2/2018 11:14 AM: ADDERALL           Encounter-Level CSA Scan Date - 04/25/2016:    Scan on 4/27/2016  8:07 AM: Adderall        Last office visit:  5/11/20

## 2021-06-14 NOTE — TELEPHONE ENCOUNTER
Call pt - He is overdue for visit before further refills.  He can schedule a virtual or face to face visit with me tomorrow if he likes.

## 2021-06-14 NOTE — TELEPHONE ENCOUNTER
Left message to call back for: pt  Information to relay to patient:  Please relay MD's message and schedule visit for pt. xl

## 2021-06-14 NOTE — TELEPHONE ENCOUNTER
Refill Approved    Rx renewed per Medication Renewal Policy. Medication was last renewed on 1/4/21, last OV 1/4/21.    Diana Hope, [facility] Triage/Med Refill 1/31/2021     Requested Prescriptions   Pending Prescriptions Disp Refills     metoprolol succinate (TOPROL-XL) 50 MG 24 hr tablet [Pharmacy Med Name: METOPROLOL ER SUCCINATE 50MG TABS] 30 tablet 0     Sig: TAKE 1 TABLET(50 MG) BY MOUTH DAILY       Beta-Blockers Refill Protocol Passed - 1/30/2021 10:00 AM        Passed - PCP or prescribing provider visit in past 12 months or next 3 months     Last office visit with prescriber/PCP: 1/4/2021 Catarina Mcfadden MD OR same dept: 1/4/2021 Catarina Mcfadden MD OR same specialty: 1/4/2021 Catarina Mcfadden MD  Last physical: Visit date not found Last MTM visit: Visit date not found   Next visit within 3 mo: Visit date not found  Next physical within 3 mo: Visit date not found  Prescriber OR PCP: Catarina Mcfadden MD  Last diagnosis associated with med order: 1. Essential hypertension  - metoprolol succinate (TOPROL-XL) 50 MG 24 hr tablet [Pharmacy Med Name: METOPROLOL ER SUCCINATE 50MG TABS]; TAKE 1 TABLET(50 MG) BY MOUTH DAILY  Dispense: 30 tablet; Refill: 0    If protocol passes may refill for 12 months if within 3 months of last provider visit (or a total of 15 months).             Passed - Blood pressure filed in past 12 months     BP Readings from Last 1 Encounters:   01/04/21 (!) 160/100

## 2021-06-14 NOTE — PROGRESS NOTES
"SUBJECTIVE: Claudio Thacker is a 40 y.o. male with:  Chief Complaint   Patient presents with     Follow-up     Med check     Medication Refill     Adderall    1) ADD- He has been taking Adderall XR 20 mg daily and it does help his concentration.  He ran out of med for a few days and noticed a big difference in his work.  He is working for an agency but looking for other jobs.  He would like to continue on the Adderall.  No side effects.      2) Elevated blood pressure - He has had intermittent elevation of BP and has never taken any meds.  No other symptoms. No shortness of breath / chest pain.  He also has a tremor in both his hands that has gotten worse lately. No tremor in his neck or head.  He denies any changes in diet.    SH:  with children.    Patient Active Problem List   Diagnosis     Esophageal reflux     Allergic Rhinitis     ADHD, predominantly inattentive type     Panic attack     Anxiety     Benign familial tremor     Controlled substance agreement signed      OBJECTIVE: BP (!) 160/100 (Patient Site: Left Arm, Patient Position: Sitting, Cuff Size: Adult Regular)   Pulse 89   Ht 5' 10\" (1.778 m)   Wt 187 lb (84.8 kg)   SpO2 97%   BMI 26.83 kg/m   no distress  GENERAL: Healthy, alert and no distress  EYES: Eyes grossly normal to inspection. No discharge or erythema, or obvious scleral/conjunctival abnormalities.  RESP: No audible wheeze, cough, or visible cyanosis.  No visible retractions or increased work of breathing.    NEURO: Cranial nerves grossly intact. Mentation and speech appropriate for age.  PSYCH: Mentation appears normal, affect normal/bright, judgement and insight intact, normal speech and appearance well-groomed.  Hands with resting tremor bilaterally.    BP Readings from Last 3 Encounters:   01/04/21 (!) 160/100   10/28/19 142/82   03/29/19 (!) 150/94     .Claudio was seen today for follow-up and medication refill.    Diagnoses and all orders for this visit:    Essential " hypertension- Will start beta blocker which should also help his tremor.  Recommend he get a home cuff to check BP and call with readings in 2 weeks.  He can also make a fasting lab appointment.  -     metoprolol succinate (TOPROL XL) 50 MG 24 hr tablet; Take 1 tablet (50 mg total) by mouth daily.  -     Magnesium, Red Blood Cell; Future  -     Lipid Cascade; Future  -     Comprehensive Metabolic Panel; Future    Benign familial tremor- Trial of Toprol.    ADHD, predominantly inattentive type- Stable on Adderall. F/U every 6 months.    Catarina Mcfadden

## 2021-06-14 NOTE — TELEPHONE ENCOUNTER
Controlled Substance Refill Request  Medication Name:   Requested Prescriptions     Pending Prescriptions Disp Refills     dextroamphetamine-amphetamine (ADDERALL XR) 20 MG 24 hr capsule 30 capsule 0     Sig: Take 1 capsule (20 mg total) by mouth every morning.     Date Last Fill: 11/17/19  [facility]  at   Controlled Substance Agreement on file:   Encounter-Level CSA Scan Date - 03/28/2018:    Scan on 4/2/2018 11:14 AM: ADDERALL           Encounter-Level CSA Scan Date - 04/25/2016:    Scan on 4/27/2016  8:07 AM: Adderall        Last office visit:  5/11/20  .[email or web address]

## 2021-06-16 NOTE — TELEPHONE ENCOUNTER
Controlled Substance Refill Request  Medication Name:   Requested Prescriptions     Pending Prescriptions Disp Refills     dextroamphetamine-amphetamine (ADDERALL XR) 20 MG 24 hr capsule 90 capsule 0     Sig: Take 1 capsule (20 mg total) by mouth every morning.     Date Last Fill: 1/4/21  [facility]  Submit electronically to pharmacy  Controlled Substance Agreement on file:   Encounter-Level CSA Scan Date - 03/28/2018:    Scan on 4/2/2018 11:14 AM: ADDERALL           Encounter-Level CSA Scan Date - 04/25/2016:    Scan on 4/27/2016  8:07 AM: Adderall        Last office visit:  1/4/21

## 2021-06-17 NOTE — PROGRESS NOTES
"SUBJECTIVE: Claudio Thacker is a 37 y.o. male with:  Chief Complaint   Patient presents with     Establish Care    1) Anxiety disorder - He was having panic attacks when his first child was born a couple of years ago.  He started on Prozac 20 mg daily and that has been helpful for him.  He is expecting his second child and feels a lot calmer.  No side effects on the Prozac.  He would like to continue the medicine.  Currently no panic attacks and he feels anxiety is under control.    2) ADD - He was diagnosed with ADHD in middle school and took stimulants throughout school years.  He had been on Ritalin and currently on Adderall.  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  He restarted stimulants 3 years ago.  It does helps his focus and he can stay on task.  No side effects with appetite or sleep.  He does notice problems if he is off the medicine for a period of time.  He tries not to take meds on the weekends.    3) He has seasonal allergies worse in the spring.  Uses Flonase nasal spray and OTC antihistamines.  Takes Singulair as well.  He was having trouble with chronic sore throats in the past.  The Singulair helped with snoring.    4) He has had GERD in the past and uses omeprazole off and on.    5) He has benign essential tremor.  Tried propranolol but it was not helpful.    Surgeries: wisdom teeth removed.  Patient Active Problem List   Diagnosis     Esophageal reflux     Allergic Rhinitis     ADHD, predominantly inattentive type     Panic attack     Anxiety     Benign familial tremor     Controlled substance agreement signed      SH:  with 3 yo daughter and wife expecting son in June.  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  Tries to run a couple times a week.  Diet - healthy/ cooks at home.  Alcohol: 2 drinks a week.    OBJECTIVE: /86  Pulse 80  Ht 5' 10\" " (1.778 m)  Wt 189 lb (85.7 kg)  BMI 27.12 kg/m2 no distress  Lungs: Clear to auscultation.  No retractions or tachypnea.  CV: RRR. S1 and S2 normal.  No murmurs, rubs or gallops.  PSYCH:  Awake, alert, and oriented x 3.  Mood and affect are appropriate.  Good eye contact.  Speech is normal.  No suicidal ideation.  No hallucinations.    Claudio was seen today for establish care.    Diagnoses and all orders for this visit:    ADHD, predominantly inattentive type - Stable.  Continue Adderall.  Controlled substance contract signed.    Anxiety- Stable on Prozac.  Continue at current dose.    Allergic rhinitis- Stable on flonase/ Singulair.    He will return for a physical in 6 months.     Catarina Mcfadden

## 2021-06-19 NOTE — LETTER
Letter by Catarina Mcfadden MD at      Author: Catarina Mcfadden MD Service: -- Author Type: --    Filed:  Encounter Date: 3/29/2019 Status: (Other)         [facility] FAMILY MEDICINE/OB  03/29/19    Patient: Claudio Thacker  YOB: 1980  Medical Record Number: [id number]  CSN: [id number]                                                                              Non-opioid Controlled Substance Agreement    I understand that my care provider has prescribed a controlled substance to help manage my condition(s). I am taking this medicine to help me function or work. I know this is strong medicine, and that it can cause serious side effects. Controlled substances can be sedating, addicting and may cause a dependency on the drug. They can affect my ability to drive or think, and cause depression. They need to be taken exactly as prescribed. Combining controlled substances with certain medicines or chemicals (such as cocaine, sedatives and tranquilizers, sleeping pills, meth) can be dangerous or even fatal. Also, if I stop controlled substances suddenly, I may have severe withdrawal symptoms.  If not helpful, I may be asked to stop them.    The risks, benefits, and side effects of these medicine(s) were explained to me. I agree that:    1. I will take part in other treatments as advised by my care team. This may be psychiatry or counseling, physical therapy, behavioral therapy, group treatment or a referral to a pain clinic. I will reduce or stop my medicine when my care team tells me to do so.  2. I will take my medicines as prescribed. I will not change the dose or schedule unless my care team tells me to. There will be no refills if I run out early.  I may be contactedwithout warning and asked to complete a urine drug test or pill count at any time.   3. I will keep all my appointments, and understand this is part of the monitoring of controlled substances. My care team may require  an office visit for EVERY controlled substance refill. If I miss appointments or dont follow instructions, my care team may stop my medicine.  4. I will not ask other providers to prescribe controlled substances, and I will not accept controlled substances from other people. If I need another prescribed controlled substance for a new reason, I will tell my care team within 1 business day.  5. I will use one pharmacy to fill all of my controlled substance prescriptions, and it is up to me to make sure that I do not run out of my medicines on weekends or holidays. If my care team is willing to refill my controlled substance prescription without a visit, I must request refills only during office hours, refills may take up to 3 days to process, and it may take up to 5 to 7 days for my medicine to be mailed and ready at my pharmacy. Prescriptions will not be mailed anywhere except my pharmacy.    6. I am responsible for my prescriptions. If the medicine/prescription is lost or stolen, it will not be replaced. I also agree not to share controlled substance medicines with anyone.          [facility] FAMILY MEDICINE/OB  03/29/19  Patient:  Claudio Thacker  YOB: 1980  Medical Record Number: [id number]  CSN: [id number]    7. I agree to not use ANY illegal or recreational drugs. This includes marijuana, cocaine, bath salts or other drugs. I agree not to use alcohol unless my care team says I may. I agree to give urine samples whenever asked. If I dont give a urine sample, the care team may stop my medicine.    8. If I enroll in the [place] Medical Marijuana program, I will tell my care team. I will also sign an agreement to share my medical records with my care team.    9. I will bring in my list of medicines (or my medicine bottles) each time I come to the clinic.   10. I will tell my care team right away if I become pregnant or have a new medical problem treated outside of my regular clinic.  11. I  understand that this medicine can affect my thinking and judgment. It may be unsafe for me to drive, use machinery and do dangerous tasks. I will not do any of these things until I know how the medicine affects me. If my dose changes, I will wait to see how it affects me. I will contact my care team if I have concerns about medicine side effects.    I understand that if I do not follow any of the conditions above, my prescriptions or treatment may be stopped.      I agree that my provider, clinic care team, and pharmacy may work with any city, state or federal law enforcement agency that investigates the misuse, sale, or other diversion of my controlled medicine. I will allow my provider to discuss my care with or share a copy of this agreement with any other treating provider, pharmacy or emergency room where I receive care. I agree to give up (waive) any right of privacy or confidentiality with respect to these consents.   I have read this agreement and have asked questions about anything I did not understand.    ___________________________________________________________________________  Patient signature - Date/Time  -Claudio Thacker                                      ___________________________________________________________________________  Witness signature                                                                    ___________________________________________________________________________  Provider signature- Catarina Mcfadden MD

## 2021-06-20 NOTE — LETTER
Letter by Catarina Mcfadden MD at      Author: Catarina Mcfadden MD Service: -- Author Type: --    Filed:  Encounter Date: 5/11/2020 Status: (Other)         [facility] MEDICINE/OB   05/11/20    Patient: Claudio Thacker  YOB: 1980  Medical Record Number: [id number]  CSN: [id number]                                                                              Non-opioid Controlled Substance Agreement    I understand that my care provider has prescribed a controlled substance to help manage my condition(s). I am taking this medicine to help me function or work. I know this is strong medicine, and that it can cause serious side effects. Controlled substances can be sedating, addicting and may cause a dependency on the drug. They can affect my ability to drive or think, and cause depression. They need to be taken exactly as prescribed. Combining controlled substances with certain medicines or chemicals (such as cocaine, sedatives and tranquilizers, sleeping pills, meth) can be dangerous or even fatal. Also, if I stop controlled substances suddenly, I may have severe withdrawal symptoms.  If not helpful, I may be asked to stop them.    The risks, benefits, and side effects of these medicine(s) were explained to me. I agree that:    1. I will take part in other treatments as advised by my care team. This may be psychiatry or counseling, physical therapy, behavioral therapy, group treatment or a referral to a pain clinic. I will reduce or stop my medicine when my care team tells me to do so.  2. I will take my medicines as prescribed. I will not change the dose or schedule unless my care team tells me to. There will be no refills if I run out early.  I may be contactedwithout warning and asked to complete a urine drug test or pill count at any time.   3. I will keep all my appointments, and understand this is part of the monitoring of controlled substances. My care team may require an office  visit for EVERY controlled substance refill. If I miss appointments or dont follow instructions, my care team may stop my medicine.  4. I will not ask other providers to prescribe controlled substances, and I will not accept controlled substances from other people. If I need another prescribed controlled substance for a new reason, I will tell my care team within 1 business day.  5. I will use one pharmacy to fill all of my controlled substance prescriptions, and it is up to me to make sure that I do not run out of my medicines on weekends or holidays. If my care team is willing to refill my controlled substance prescription without a visit, I must request refills only during office hours, refills may take up to 3 days to process, and it may take up to 5 to 7 days for my medicine to be mailed and ready at my pharmacy. Prescriptions will not be mailed anywhere except my pharmacy.    6. I am responsible for my prescriptions. If the medicine/prescription is lost or stolen, it will not be replaced. I also agree not to share controlled substance medicines with anyone.          [facility] MEDICINE/OB   05/11/20  Patient:  Claudio Thacker  YOB: 1980  Medical Record Number: [id number]  CSN: [id number]    7. I agree to not use ANY illegal or recreational drugs. This includes marijuana, cocaine, bath salts or other drugs. I agree not to use alcohol unless my care team says I may. I agree to give urine samples whenever asked. If I dont give a urine sample, the care team may stop my medicine.    8. If I enroll in the [place] Medical Marijuana program, I will tell my care team. I will also sign an agreement to share my medical records with my care team.    9. I will bring in my list of medicines (or my medicine bottles) each time I come to the clinic.   10. I will tell my care team right away if I become pregnant or have a new medical problem treated outside of my regular clinic.  11. I understand that this  medicine can affect my thinking and judgment. It may be unsafe for me to drive, use machinery and do dangerous tasks. I will not do any of these things until I know how the medicine affects me. If my dose changes, I will wait to see how it affects me. I will contact my care team if I have concerns about medicine side effects.    I understand that if I do not follow any of the conditions above, my prescriptions or treatment may be stopped.      I agree that my provider, clinic care team, and pharmacy may work with any city, state or federal law enforcement agency that investigates the misuse, sale, or other diversion of my controlled medicine. I will allow my provider to discuss my care with or share a copy of this agreement with any other treating provider, pharmacy or emergency room where I receive care. I agree to give up (waive) any right of privacy or confidentiality with respect to these consents.   I have read this agreement and have asked questions about anything I did not understand.    Adderall XR 20 mg daily   Follow-up visits every 6 months.    ___________________________________________________________________________  Patient signature - Date/Time  -Claudio Thacker                                      ___________________________________________________________________________  Witness signature                                                                    ___________________________________________________________________________  Provider signature- Catarina Mcfadden MD

## 2021-06-22 NOTE — TELEPHONE ENCOUNTER
Controlled Substance Refill Request  Medication:   Requested Prescriptions     Pending Prescriptions Disp Refills     dextroamphetamine-amphetamine (ADDERALL XR) 20 MG 24 hr capsule 30 capsule 0     Sig: Take 1 capsule (20 mg total) by mouth every morning.     Date Last Fill: 12/5/18  Pharmacy: [id number]   Submit electronically to pharmacy  Controlled Substance Agreement on File:   Encounter-Level CSA Scan Date - 03/28/2018:    Scan on 4/2/2018 11:14 AM: ADDERALL (below)             Encounter-Level CSA Scan Date - 04/25/2016:    Scan on 4/27/2016  8:07 AM: Adderall (below)         Last office visit: Last office visit pertaining to requested medication was 12/3/18.

## 2021-06-22 NOTE — PROGRESS NOTES
"OFFICE VISIT - FAMILY MEDICINE     ASSESSMENT AND PLAN     1. Elevated blood pressure reading without diagnosis of hypertension     We discussed about management of elevated blood pressure, patient was instructed to check his blood pressure 2-3 times a week, keep a log of his blood pressure, continue to work on healthy lifestyle changes, cut the amount of salt to less than 2 g a day, and follow-up in about 8 weeks to go over her readings.    CHIEF COMPLAINT   Blood Pressure Check    HPI   Claudio Thacker is a 38 y.o. male.  Past history of ADHD diagnosed in childhood, anxiety with panic attacks, benign familial tremor, GERD, and allergic rhinitis.    Went to an urgent care about 2 weeks ago for sinusitis  type of symptoms, blood pressure was elevated systolic around 160 and diastolics around 100, did not have any symptoms.  Has been checking outpatient still mildly elevated, and again denies any symptoms, dad has history of hypertension.  Has has been in a lot of stress at home lately does have a 5 months boy, not sleeping regularly, he also on Adderall for ADHD, has been on this medication for several years.  Pressure appears normal today, and again he denies any chest pain, shortness of breath or dizziness.  No swelling to the lower extremities.      Review of Systems As per HPI, otherwise negative.    OBJECTIVE   /80 (Patient Site: Right Arm)   Pulse 68   Temp 98.6  F (37  C) (Oral)   Resp 12   Ht 5' 10\" (1.778 m)   Wt 188 lb (85.3 kg)   BMI 26.98 kg/m    Physical Exam   Constitutional: He is oriented to person, place, and time. He appears well-developed and well-nourished.   HENT:   Head: Normocephalic and atraumatic.   Neck: Normal range of motion. Neck supple. No JVD present. No tracheal deviation present. No thyromegaly present.   Cardiovascular: Normal rate, regular rhythm, normal heart sounds and intact distal pulses. Exam reveals no gallop and no friction rub.   No murmur " heard.  Pulmonary/Chest: Effort normal and breath sounds normal. No respiratory distress. He has no wheezes. He has no rales.   Musculoskeletal: He exhibits no edema or tenderness.   Lymphadenopathy:     He has no cervical adenopathy.   Neurological: He is alert and oriented to person, place, and time. Coordination normal.   Psychiatric: He has a normal mood and affect. Judgment and thought content normal.       [facility]   No family history on file.  Social History     Socioeconomic History     Marital status: Single     Spouse name: Not on file     Number of children: Not on file     Years of education: Not on file     Highest education level: Not on file   Social Needs     Financial resource strain: Not on file     Food insecurity - worry: Not on file     Food insecurity - inability: Not on file     Transportation needs - medical: Not on file     Transportation needs - non-medical: Not on file   Occupational History     Not on file   Tobacco Use     Smoking status: Never Smoker     Smokeless tobacco: Former User   Substance and Sexual Activity     Alcohol use: Yes     Drug use: No     Sexual activity: Yes     Partners: Female     Birth control/protection: None   Other Topics Concern     Not on file   Social History Narrative     Not on file     Relevant history was reviewed with the patient today, unless noted in HPI, nothing is pertinent for this visit.  [facility]     Patient Active Problem List    Diagnosis Date Noted     Controlled substance agreement signed 04/25/2016     Overview Note:     4/25/2016       ADHD, predominantly inattentive type 03/21/2016     Panic attack 03/21/2016     Anxiety 03/21/2016     Benign familial tremor 03/21/2016     Esophageal reflux      Overview Note:     Controlled with prn omeprazole.       Allergic Rhinitis      Overview Note:     Diagnosed in childhood, at [facility].  Records not available.  Replacement Utility updated for latest IMO load       No past surgical  history on file.    RESULTS/CONSULTS (Lab/Rad)   No results found for this or any previous visit (from the past 168 hour(s)).  No results found.  MEDICATIONS     Current Outpatient Medications on File Prior to Visit   Medication Sig Dispense Refill     dextroamphetamine-amphetamine (ADDERALL XR) 20 MG 24 hr capsule Take 1 capsule (20 mg total) by mouth every morning. 30 capsule 0     FLUoxetine (PROZAC) 20 MG capsule TAKE 1 CAPSULE(20 MG) BY MOUTH DAILY 90 capsule 0     fluticasone (FLONASE) 50 mcg/actuation nasal spray SHAKE WELL AND USE 1 SPRAY IN EACH NOSTRIL DAILY 48 g 3     montelukast (SINGULAIR) 10 mg tablet Take 1 tablet (10 mg total) by mouth at bedtime. 90 tablet 3     No current facility-administered medications on file prior to visit.        HEALTH MAINTENANCE / SCREENING   No Data Recorded, No Data Recorded,No Data Recorded  Immunization History   Administered Date(s) Administered     Hep A, Adult IM (19yr & older) 01/01/2001, 06/01/2001     Hep A, historic 01/01/2001, 06/01/2001     Influenza, inj, historic,unspecified 10/05/2014     Tdap 01/01/2008, 06/22/2008, 10/19/2014     Typhoid, Inj, Inactive 10/22/2014     Health Maintenance   Topic     INFLUENZA VACCINE RULE BASED (1)     ADVANCE DIRECTIVES DISCUSSED WITH PATIENT      TD 18+ HE      TDAP ADULT ONE TIME DOSE        Inocencio Peralta MD  Family Medicine, [facility], [place], [facility]     This note was dictated using a voice recognition software.  Any grammatical or context distortion are unintentional and inherent to the software.

## 2021-06-23 NOTE — TELEPHONE ENCOUNTER
Controlled Substance Refill Request  Medication:   Requested Prescriptions     Pending Prescriptions Disp Refills     dextroamphetamine-amphetamine (ADDERALL XR) 20 MG 24 hr capsule 30 capsule 0     Sig: Take 1 capsule (20 mg total) by mouth every morning.     Date Last Fill: 1/4/19  Pharmacy:  [id number]  Submit electronically to pharmacy  Controlled Substance Agreement on File:   Encounter-Level CSA Scan Date - 03/28/2018:    Scan on 4/2/2018 11:14 AM: ADDERALL (below)             Encounter-Level CSA Scan Date - 04/25/2016:    Scan on 4/27/2016  8:07 AM: Adderall (below)         Last office visit: Last office visit pertaining to requested medication was 3/28/18.

## 2021-06-24 NOTE — TELEPHONE ENCOUNTER
Adderall last Rxed 2/4/19.  Last medcheck with you was in 3/2018.  Prozac last Rxed on 11/29/18.  Will you refill?  I sent a message to pt to make an appt with you every 6 months as Adderall is a control subtance.

## 2021-07-02 ENCOUNTER — COMMUNICATION - HEALTHEAST (OUTPATIENT)
Dept: FAMILY MEDICINE | Facility: CLINIC | Age: 41
End: 2021-07-02

## 2021-07-02 DIAGNOSIS — F90.0 ADHD, PREDOMINANTLY INATTENTIVE TYPE: ICD-10-CM

## 2021-07-04 NOTE — TELEPHONE ENCOUNTER
Telephone Encounter by Marija Delgadillo RN at 7/2/2021  2:47 PM     Author: Marija Delgadillo RN Service: -- Author Type: Registered Nurse    Filed: 7/2/2021  2:48 PM Encounter Date: 7/2/2021 Status: Signed    : Marija Delgadillo RN (Registered Nurse)       Controlled Substance Refill Request  Medication Name:   Requested Prescriptions     Pending Prescriptions Disp Refills   ? dextroamphetamine-amphetamine (ADDERALL XR) 20 MG 24 hr capsule 90 capsule 0     Sig: Take 1 capsule (20 mg total) by mouth every morning.     Date Last Fill: 4/5/2021  [facility]  Submit electronically to pharmacy  Controlled Substance Agreement on file:   Encounter-Level CSA Scan Date - 03/28/2018:    Scan on 4/2/2018 11:14 AM: ADDERALL           Encounter-Level CSA Scan Date - 04/25/2016:    Scan on 4/27/2016  8:07 AM: Adderall        Last office visit:  1/4/2021

## 2021-07-13 ENCOUNTER — OFFICE VISIT (OUTPATIENT)
Dept: FAMILY MEDICINE | Facility: CLINIC | Age: 41
End: 2021-07-13
Payer: COMMERCIAL

## 2021-07-13 VITALS
OXYGEN SATURATION: 98 % | DIASTOLIC BLOOD PRESSURE: 90 MMHG | HEART RATE: 65 BPM | BODY MASS INDEX: 25.58 KG/M2 | HEIGHT: 70 IN | WEIGHT: 178.7 LBS | SYSTOLIC BLOOD PRESSURE: 148 MMHG

## 2021-07-13 DIAGNOSIS — F90.0 ADHD, PREDOMINANTLY INATTENTIVE TYPE: Primary | ICD-10-CM
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
## 2021-07-13 DIAGNOSIS — L72.3 SEBACEOUS CYST: ICD-10-CM

## 2021-07-13 DIAGNOSIS — I10 ESSENTIAL HYPERTENSION: ICD-10-CM

## 2021-07-13 PROCEDURE — 99214 OFFICE O/P EST MOD 30 MIN: CPT | Performed by: FAMILY MEDICINE

## 2021-07-13 PROCEDURE — 96127 BRIEF EMOTIONAL/BEHAV ASSMT: CPT | Performed by: FAMILY MEDICINE

## 2021-07-13 RX ORDER — METOPROLOL SUCCINATE 100 MG/1
100 TABLET, EXTENDED RELEASE ORAL DAILY
Qty: 90 TABLET | Refills: 3 | Status: SHIPPED | OUTPATIENT
Start: 2021-07-13 | End: 2022-06-21

## 2021-07-13 RX ORDER — METOPROLOL SUCCINATE 50 MG/1
TABLET, EXTENDED RELEASE ORAL DAILY
COMMUNITY
Start: 2021-04-28 | End: 2022-01-25

## 2021-07-13 RX ORDER — DEXTROAMPHETAMINE SACCHARATE, AMPHETAMINE ASPARTATE MONOHYDRATE, DEXTROAMPHETAMINE SULFATE AND AMPHETAMINE SULFATE 5; 5; 5; 5 MG/1; MG/1; MG/1; MG/1
CAPSULE, EXTENDED RELEASE ORAL DAILY
COMMUNITY
Start: 2021-07-02 | End: 2021-09-27

## 2021-07-13 ASSESSMENT — MIFFLIN-ST. JEOR: SCORE: 1736.83

## 2021-07-13 NOTE — LETTER
[facility]  07/13/21  Patient: Claudio Thacker  YOB: 1980  Medical Record Number: [id number]                                                                                  Non-Opioid Controlled Substance Agreement    This is an agreement between you and your provider regarding safe and appropriate use of controlled substances prescribed by your care team. Controlled substances are?medicines that can cause physical and mental dependence (abuse).     There are strict laws about having and using these medicines. We here at [facility] are  committed to working with you in your efforts to get better. To support you in this work, we'll help you schedule regular office appointments for medicine refills. If we must cancel or change your appointment for any reason, we'll make sure you have enough medicine to last until your next appointment.     As a Provider, I will:     Listen carefully to your concerns while treating you with respect.     Recommend a treatment plan that I believe is in your best interest and may involve therapies other than medicine.      Talk with you often about the possible benefits and the risk of harm of any medicine that we prescribe for you.    Assess the safety of this medicine and check how well it works.      Provide a plan on how to taper (discontinue or go off) using this medicine if the decision is made to stop its use.      ::  As a Patient, I understand controlled substances:       Are prescribed by my care provider to help me function or work and manage my condition(s).?    Are strong medicines and can cause serious side effects.       Need to be taken exactly as prescribed.?Combining controlled substances with certain medicines or chemicals (such as illegal drugs, alcohol, sedatives, sleeping pills, and benzodiazepines) can be dangerous or even fatal.? If I stop taking my medicines suddenly, I may have severe withdrawal symptoms.     The  risks, benefits, and side effects of these medicine(s) were explained to me. I agree that:    1. I will take part in other treatments as advised by my care team. This may be psychiatry or counseling, physical therapy, behavioral therapy, group treatment or a referral to specialist.    2. I will keep all my appointments and understand this is part of the monitoring of controlled substances.?My care team may require an office visit for EVERY controlled substance refill. If I miss appointments or don t follow instructions, my care team may stop my medicine    3. I will take my medicines as prescribed. I will not change the dose or schedule unless my care team tells me to. There will be no refills if I run out early.      4. I may be asked to come to the clinic and complete a urine drug test or complete a pill count. If I don t give a urine sample or participate in a pill count, the care team may stop my medicine.    5. I will only receive controlled substance prescriptions from this clinic. If I am treated by another provider, I will tell them that I am taking controlled substances and that I have a treatment agreement with this provider. I will inform my [facility] care team within one business day if I am given a prescription for any controlled substance by another healthcare provider. My [facility] care team can contact other providers and pharmacists about my use of any medicines.    6. It is up to me to make sure that I don't run out of my medicines on weekends or holidays.?If my care team is willing to refill my prescription without a visit, I must request refills only during office hours. Refills may take up to 3 business days to process. I will use one pharmacy to fill all my controlled substance prescriptions. I will notify the clinic about any changes to my insurance or medicine availability.    7. I am responsible for my prescriptions. If the medicine/prescription is lost, stolen or destroyed,  it will not be replaced.?I also agree not to share controlled substance medicines with anyone.     8. I am aware I should not use any illegal or recreational drugs. I agree not to drink alcohol unless my care team says I can.     9. If I enroll in the [place] Medical Cannabis program, I will tell my care team before my next refill.    10. I will tell my care team right away if I become pregnant, have a new medical problem treated outside of my regular clinic, or have a change in my medicines.     11. I understand that this medicine can affect my thinking, judgment and reaction time.? Alcohol and drugs affect the brain and body, which can affect the safety of my driving. Being under the influence of alcohol or drugs can affect my decision-making, behaviors, personal safety and the safety of others. Driving while impaired (DWI) can occur if a person is driving, operating or in physical control of a car, motorcycle, boat, snowmobile, ATV, motorbike, off-road vehicle or any other motor vehicle ([place] Statute 169A.20). I understand the risk if I choose to drive or operate any vehicle or machinery.    I understand that if I do not follow any of the conditions above, my prescriptions or treatment may be stopped or changed.   I agree that my provider, clinic care team and pharmacy may work with any city, state or federal law enforcement agency that investigates the misuse, sale or other diversion of my controlled medicine. I will allow my provider to discuss my care with, or share a copy of, this agreement with any other treating provider, pharmacy or emergency room where I receive care.     I have read this agreement and have asked questions about anything I did not understand.    ________________________________________________________  Patient Signature - Claudio Thacker     ___________________                   Date     ________________________________________________________  Provider Signature - Catarina LORD  Bogdan, MD       ___________________                   Date     ________________________________________________________  Witness Signature (required if provider not present while patient signing)          ___________________                   Date

## 2021-07-13 NOTE — PROCEDURES
Patient gives consent and lesion anesthetized with lidocaine with epi and sterily prepped with betadine.  #15 blade was used to make incision and sebum and cyst sac removed.  Wound was dressed with antibiotic ointment and dressing.

## 2021-07-13 NOTE — PROGRESS NOTES
Assessment  1. ADHD, predominantly inattentive type - stable   2. Essential hypertension - Increase metoprolol   3. Sebaceous cyst - Excised   4. Tick bite, initial encounter - No need for antibiotics.       Plan  Orders Placed This Encounter     amphetamine-dextroamphetamine (ADDERALL XR) 20 MG 24 hr capsule     metoprolol succinate ER (TOPROL-XL) 50 MG 24 hr tablet     metoprolol succinate ER (TOPROL-XL) 100 MG 24 hr tablet     Patient Instructions   Increase metoprolol to 100 mg daily  Check blood pressures with home cuff and send me My Chart message in 2 weeks    For wound:  Keep bandage on until tomorrow.  OK to shower  Dress with bandage and antibiotic ointment until healed over then can stop  Call if any redness / worsening pain / discharge    Follow- up in 6 months      SUBJECTIVE: Claudio Thacker is a 39 yo male with:   Chief Complaint   Patient presents with     Recheck Medication     Skin Spots     Spot on back had it for a few weeks     1) Tick bite - Occurred 12 days ago in [place].  Tick was on 6 hours attached to right chest.  Not engorged.  Looked like a wood tick.  No rash/ fever.    2) He has a lump in center of back.  Going on for awhile.  No change.  Painful when rubbed or bumped.  Has drained some.      3) ADD- Doing well with Adderall 20 mg XR.  Takes daily.  Work going well- back to office.  No side effects effects.  Sleep is good.    4) Elevated blood pressure - He has lost 10 lb and is exercising more.  He is taking metoprolol ER 50 mg - was for tremor but not completely helping.  Has been under some stress at work.    Patient Active Problem List   Diagnosis     Esophageal reflux     Allergic Rhinitis     ADHD, predominantly inattentive type     Panic attack     Anxiety     Benign familial tremor     Controlled substance agreement signed      SH:  with children.  Works for agency.    OBJECTIVE: BP (!) 148/90 (BP Location: Left arm, Patient Position: Sitting, Cuff Size: Adult Regular)  "  Pulse 65   Ht 1.778 m (5' 10\")   Wt 81.1 kg (178 lb 11.2 oz)   SpO2 98%   BMI 25.64 kg/m   no distress  Right lower chest: 5 mm pink dry macule.  Back: 2 cm lump mid left back with open area in center and some erythema.  Fluctuant.      BP Readings from Last 6 Encounters:   07/13/21 (!) 148/90   01/04/21 (!) 160/100   10/28/19 (!) 142/82   10/19/14 124/56   04/28/12 120/82   04/25/10 118/84     PROCEDURE:    Patient gives consent and lesion anesthetized with lidocaine with epi and sterily prepped with betadine.  #15 blade was used to make incision and sebum and cyst sac removed.  Wound was dressed with antibiotic ointment and dressing.    Catarina Mcfadden MD   "

## 2021-09-25 ENCOUNTER — HEALTH MAINTENANCE LETTER (OUTPATIENT)
Age: 41
End: 2021-09-25

## 2021-09-27 ENCOUNTER — MYC MEDICAL ADVICE (OUTPATIENT)
Dept: FAMILY MEDICINE | Facility: CLINIC | Age: 41
End: 2021-09-27

## 2021-09-27 DIAGNOSIS — F90.0 ADHD, PREDOMINANTLY INATTENTIVE TYPE: Primary | ICD-10-CM

## 2021-09-27 RX ORDER — DEXTROAMPHETAMINE SACCHARATE, AMPHETAMINE ASPARTATE MONOHYDRATE, DEXTROAMPHETAMINE SULFATE AND AMPHETAMINE SULFATE 5; 5; 5; 5 MG/1; MG/1; MG/1; MG/1
20 CAPSULE, EXTENDED RELEASE ORAL DAILY
Qty: 30 CAPSULE | Refills: 0 | Status: SHIPPED | OUTPATIENT
Start: 2021-09-27 | End: 2021-11-01

## 2021-11-01 ENCOUNTER — MYC MEDICAL ADVICE (OUTPATIENT)
Dept: FAMILY MEDICINE | Facility: CLINIC | Age: 41
End: 2021-11-01

## 2021-11-01 DIAGNOSIS — F90.0 ADHD, PREDOMINANTLY INATTENTIVE TYPE: ICD-10-CM

## 2021-11-01 RX ORDER — DEXTROAMPHETAMINE SACCHARATE, AMPHETAMINE ASPARTATE MONOHYDRATE, DEXTROAMPHETAMINE SULFATE AND AMPHETAMINE SULFATE 5; 5; 5; 5 MG/1; MG/1; MG/1; MG/1
20 CAPSULE, EXTENDED RELEASE ORAL DAILY
Qty: 30 CAPSULE | Refills: 0 | Status: SHIPPED | OUTPATIENT
Start: 2021-11-01 | End: 2021-11-30

## 2021-11-30 ENCOUNTER — MYC REFILL (OUTPATIENT)
Dept: FAMILY MEDICINE | Facility: CLINIC | Age: 41
End: 2021-11-30
Payer: COMMERCIAL

## 2021-11-30 DIAGNOSIS — F90.0 ADHD, PREDOMINANTLY INATTENTIVE TYPE: ICD-10-CM

## 2021-12-02 RX ORDER — DEXTROAMPHETAMINE SACCHARATE, AMPHETAMINE ASPARTATE MONOHYDRATE, DEXTROAMPHETAMINE SULFATE AND AMPHETAMINE SULFATE 5; 5; 5; 5 MG/1; MG/1; MG/1; MG/1
20 CAPSULE, EXTENDED RELEASE ORAL DAILY
Qty: 30 CAPSULE | Refills: 0 | Status: SHIPPED | OUTPATIENT
Start: 2021-12-02 | End: 2022-01-03

## 2021-12-02 NOTE — TELEPHONE ENCOUNTER
Routing refill request to provider for review/approval because:  Controlled substance request    Last Written Prescription Date:  11/1/21  Last Fill Quantity: 30,  # refills: 0   Last office visit provider:  7/13/21     Requested Prescriptions   Pending Prescriptions Disp Refills     amphetamine-dextroamphetamine (ADDERALL XR) 20 MG 24 hr capsule 30 capsule 0     Sig: Take 1 capsule (20 mg) by mouth daily       There is no refill protocol information for this order          Ishaan Schwartz RN 12/02/21 10:14 AM

## 2022-01-03 ENCOUNTER — MYC REFILL (OUTPATIENT)
Dept: FAMILY MEDICINE | Facility: CLINIC | Age: 42
End: 2022-01-03
Payer: COMMERCIAL

## 2022-01-03 DIAGNOSIS — F90.0 ADHD, PREDOMINANTLY INATTENTIVE TYPE: ICD-10-CM

## 2022-01-06 ENCOUNTER — MYC MEDICAL ADVICE (OUTPATIENT)
Dept: FAMILY MEDICINE | Facility: CLINIC | Age: 42
End: 2022-01-06
Payer: COMMERCIAL

## 2022-01-06 DIAGNOSIS — F90.0 ADHD, PREDOMINANTLY INATTENTIVE TYPE: ICD-10-CM

## 2022-01-06 RX ORDER — DEXTROAMPHETAMINE SACCHARATE, AMPHETAMINE ASPARTATE MONOHYDRATE, DEXTROAMPHETAMINE SULFATE AND AMPHETAMINE SULFATE 5; 5; 5; 5 MG/1; MG/1; MG/1; MG/1
20 CAPSULE, EXTENDED RELEASE ORAL DAILY
Qty: 30 CAPSULE | Refills: 0 | Status: SHIPPED | OUTPATIENT
Start: 2022-01-06 | End: 2022-02-08

## 2022-01-06 RX ORDER — DEXTROAMPHETAMINE SACCHARATE, AMPHETAMINE ASPARTATE MONOHYDRATE, DEXTROAMPHETAMINE SULFATE AND AMPHETAMINE SULFATE 5; 5; 5; 5 MG/1; MG/1; MG/1; MG/1
20 CAPSULE, EXTENDED RELEASE ORAL DAILY
Qty: 30 CAPSULE | Refills: 0 | Status: SHIPPED | OUTPATIENT
Start: 2022-01-06 | End: 2022-01-06

## 2022-01-06 NOTE — TELEPHONE ENCOUNTER
Routing refill request to provider for review/approval because:  Controlled substance. Patient due for 6 month review.     Last Written Prescription Date:  12/2/2021  Last Fill Quantity: 30,  # refills: 0   Last office visit provider:  7/13/2021     Requested Prescriptions   Pending Prescriptions Disp Refills     amphetamine-dextroamphetamine (ADDERALL XR) 20 MG 24 hr capsule 30 capsule 0     Sig: Take 1 capsule (20 mg) by mouth daily       There is no refill protocol information for this order          Becky Rubalcava RN 01/06/22 1:16 PM

## 2022-01-10 ENCOUNTER — TELEPHONE (OUTPATIENT)
Dept: FAMILY MEDICINE | Facility: CLINIC | Age: 42
End: 2022-01-10
Payer: COMMERCIAL

## 2022-01-15 ENCOUNTER — HEALTH MAINTENANCE LETTER (OUTPATIENT)
Age: 42
End: 2022-01-15

## 2022-01-25 ENCOUNTER — OFFICE VISIT (OUTPATIENT)
Dept: FAMILY MEDICINE | Facility: CLINIC | Age: 42
End: 2022-01-25
Payer: COMMERCIAL

## 2022-01-25 VITALS
SYSTOLIC BLOOD PRESSURE: 140 MMHG | HEIGHT: 70 IN | WEIGHT: 188.1 LBS | DIASTOLIC BLOOD PRESSURE: 90 MMHG | BODY MASS INDEX: 26.93 KG/M2 | HEART RATE: 63 BPM | OXYGEN SATURATION: 96 %

## 2022-01-25 DIAGNOSIS — I10 ESSENTIAL HYPERTENSION: ICD-10-CM

## 2022-01-25 DIAGNOSIS — F90.0 ADHD, PREDOMINANTLY INATTENTIVE TYPE: Primary | ICD-10-CM

## 2022-01-25 LAB
ALBUMIN SERPL-MCNC: 4.6 G/DL (ref 3.5–5)
ALP SERPL-CCNC: 84 U/L (ref 45–120)
ALT SERPL W P-5'-P-CCNC: 68 U/L (ref 0–45)
ANION GAP SERPL CALCULATED.3IONS-SCNC: 14 MMOL/L (ref 5–18)
AST SERPL W P-5'-P-CCNC: 41 U/L (ref 0–40)
BILIRUB SERPL-MCNC: 2.7 MG/DL (ref 0–1)
BUN SERPL-MCNC: 17 MG/DL (ref 8–22)
CALCIUM SERPL-MCNC: 9.9 MG/DL (ref 8.5–10.5)
CHLORIDE BLD-SCNC: 103 MMOL/L (ref 98–107)
CHOLEST SERPL-MCNC: 262 MG/DL
CO2 SERPL-SCNC: 21 MMOL/L (ref 22–31)
CREAT SERPL-MCNC: 1.01 MG/DL (ref 0.7–1.3)
FASTING STATUS PATIENT QL REPORTED: NO
GFR SERPL CREATININE-BSD FRML MDRD: >90 ML/MIN/1.73M2
GLUCOSE BLD-MCNC: 85 MG/DL (ref 70–125)
HDLC SERPL-MCNC: 50 MG/DL
LDLC SERPL CALC-MCNC: 198 MG/DL
MAGNESIUM SERPL-MCNC: 2.1 MG/DL (ref 1.8–2.6)
POTASSIUM BLD-SCNC: 4.3 MMOL/L (ref 3.5–5)
PROT SERPL-MCNC: 7.7 G/DL (ref 6–8)
SODIUM SERPL-SCNC: 138 MMOL/L (ref 136–145)

## 2022-01-25 PROCEDURE — 36415 COLL VENOUS BLD VENIPUNCTURE: CPT | Performed by: FAMILY MEDICINE

## 2022-01-25 PROCEDURE — 82465 ASSAY BLD/SERUM CHOLESTEROL: CPT | Performed by: FAMILY MEDICINE

## 2022-01-25 PROCEDURE — 83721 ASSAY OF BLOOD LIPOPROTEIN: CPT | Mod: 59 | Performed by: FAMILY MEDICINE

## 2022-01-25 PROCEDURE — 83735 ASSAY OF MAGNESIUM: CPT | Performed by: FAMILY MEDICINE

## 2022-01-25 PROCEDURE — 83718 ASSAY OF LIPOPROTEIN: CPT | Performed by: FAMILY MEDICINE

## 2022-01-25 PROCEDURE — 80053 COMPREHEN METABOLIC PANEL: CPT | Performed by: FAMILY MEDICINE

## 2022-01-25 PROCEDURE — 99214 OFFICE O/P EST MOD 30 MIN: CPT | Performed by: FAMILY MEDICINE

## 2022-01-25 RX ORDER — LISINOPRIL 5 MG/1
5 TABLET ORAL DAILY
Qty: 30 TABLET | Refills: 1 | Status: SHIPPED | OUTPATIENT
Start: 2022-01-25 | End: 2022-02-23

## 2022-01-25 ASSESSMENT — MIFFLIN-ST. JEOR: SCORE: 1774.47

## 2022-01-25 NOTE — PROGRESS NOTES
"Claudio was seen today for blood pressure check and recheck medication.    Diagnoses and all orders for this visit:    ADHD, predominantly inattentive type- Stable.  Controlled substance contract signed.  F/U 6 months.      Essential hypertension- Not well controlled.  Add lisinopril to metoprolol.  -     lisinopril (ZESTRIL) 5 MG tablet; Take 1 tablet (5 mg) by mouth daily  -     Comprehensive metabolic panel (BMP + Alb, Alk Phos, ALT, AST, Total. Bili, TP)  -     Magnesium  -     Cholesterol  -     HDL cholesterol  -     LDL cholesterol direct      F/U in 1 months.    SUBJECTIVE: Claudio Thacker is a 41 year old male who presents with the following:  Patient presents with:  Blood Pressure Check  Recheck Medication: F/u on Adderall medication    1) ADHD - He has been taking Adderall XR 20 mg daily.  He has been on this medicine for 6 years and doing well.  No side effects.  Helps him concentrate at work.  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    2) HTN - He is taking metoprolol  mg daily.  No side effects. He has been taking BP at home.  Running 120s-160 - 80s-90s.  Using wrist monitor.  Has added magnesium.  He has stopped alcohol for January and has been working out on rowing machine.    OBJECTIVE: BP (!) 140/90 (BP Location: Left arm, Patient Position: Sitting, Cuff Size: Adult Regular)   Pulse 63   Ht 1.778 m (5' 10\")   Wt 85.3 kg (188 lb 1.6 oz)   SpO2 96%   BMI 26.99 kg/m   no distress  Lungs: Clear to auscultation.  No retractions or tachypnea.  CV: RRR. S1 and S2 normal.  No murmurs, rubs or gallops.      BP Readings from Last 6 Encounters:   01/25/22 (!) 140/90   07/13/21 (!) 148/90   01/04/21 (!) 160/100   10/28/19 (!) 142/82   10/19/14 124/56   04/28/12 120/82             Catarina Mcfadden MD   "

## 2022-01-25 NOTE — LETTER
[facility]  01/25/22  Patient: Claudio Thacker  YOB: 1980  Medical Record Number: [id number]                                                                                  Non-Opioid Controlled Substance Agreement    This is an agreement between you and your provider regarding safe and appropriate use of controlled substances prescribed by your care team. Controlled substances are?medicines that can cause physical and mental dependence (abuse).     There are strict laws about having and using these medicines. We here at [facility] are  committed to working with you in your efforts to get better. To support you in this work, we'll help you schedule regular office appointments for medicine refills. If we must cancel or change your appointment for any reason, we'll make sure you have enough medicine to last until your next appointment.     As a Provider, I will:     Listen carefully to your concerns while treating you with respect.     Recommend a treatment plan that I believe is in your best interest and may involve therapies other than medicine.      Talk with you often about the possible benefits and the risk of harm of any medicine that we prescribe for you.    Assess the safety of this medicine and check how well it works.      Provide a plan on how to taper (discontinue or go off) using this medicine if the decision is made to stop its use.      ::  As a Patient, I understand controlled substances:       Are prescribed by my care provider to help me function or work and manage my condition(s).?    Are strong medicines and can cause serious side effects.       Need to be taken exactly as prescribed.?Combining controlled substances with certain medicines or chemicals (such as illegal drugs, alcohol, sedatives, sleeping pills, and benzodiazepines) can be dangerous or even fatal.? If I stop taking my medicines suddenly, I may have severe withdrawal symptoms.     The  risks, benefits, and side effects of these medicine(s) were explained to me. I agree that:    1. I will take part in other treatments as advised by my care team. This may be psychiatry or counseling, physical therapy, behavioral therapy, group treatment or a referral to specialist.    2. I will keep all my appointments and understand this is part of the monitoring of controlled substances.?My care team may require an office visit for EVERY controlled substance refill. If I miss appointments or don t follow instructions, my care team may stop my medicine    3. I will take my medicines as prescribed. I will not change the dose or schedule unless my care team tells me to. There will be no refills if I run out early.      4. I may be asked to come to the clinic and complete a urine drug test or complete a pill count. If I don t give a urine sample or participate in a pill count, the care team may stop my medicine.    5. I will only receive controlled substance prescriptions from this clinic. If I am treated by another provider, I will tell them that I am taking controlled substances and that I have a treatment agreement with this provider. I will inform my [facility] care team within one business day if I am given a prescription for any controlled substance by another healthcare provider. My [facility] care team can contact other providers and pharmacists about my use of any medicines.    6. It is up to me to make sure that I don't run out of my medicines on weekends or holidays.?If my care team is willing to refill my prescription without a visit, I must request refills only during office hours. Refills may take up to 3 business days to process. I will use one pharmacy to fill all my controlled substance prescriptions. I will notify the clinic about any changes to my insurance or medicine availability.    7. I am responsible for my prescriptions. If the medicine/prescription is lost, stolen or destroyed,  it will not be replaced.?I also agree not to share controlled substance medicines with anyone.     8. I am aware I should not use any illegal or recreational drugs. I agree not to drink alcohol unless my care team says I can.     9. If I enroll in the [place] Medical Cannabis program, I will tell my care team before my next refill.    10. I will tell my care team right away if I become pregnant, have a new medical problem treated outside of my regular clinic, or have a change in my medicines.     11. I understand that this medicine can affect my thinking, judgment and reaction time.? Alcohol and drugs affect the brain and body, which can affect the safety of my driving. Being under the influence of alcohol or drugs can affect my decision-making, behaviors, personal safety and the safety of others. Driving while impaired (DWI) can occur if a person is driving, operating or in physical control of a car, motorcycle, boat, snowmobile, ATV, motorbike, off-road vehicle or any other motor vehicle ([place] Statute 169A.20). I understand the risk if I choose to drive or operate any vehicle or machinery.    I understand that if I do not follow any of the conditions above, my prescriptions or treatment may be stopped or changed.   I agree that my provider, clinic care team and pharmacy may work with any city, state or federal law enforcement agency that investigates the misuse, sale or other diversion of my controlled medicine. I will allow my provider to discuss my care with, or share a copy of, this agreement with any other treating provider, pharmacy or emergency room where I receive care.     I have read this agreement and have asked questions about anything I did not understand.    ________________________________________________________  Patient Signature - Claudio Thacker     ___________________                   Date     ________________________________________________________  Provider Signature - Catarina LORD  Bogdan, MD       ___________________                   Date     ________________________________________________________  Witness Signature (required if provider not present while patient signing)          ___________________                   Date

## 2022-01-27 RX ORDER — LISINOPRIL 5 MG/1
TABLET ORAL
Qty: 90 TABLET | OUTPATIENT
Start: 2022-01-27

## 2022-01-27 NOTE — TELEPHONE ENCOUNTER
Duplicate Rx, medication was sent in recently to same pharmacy requested.     lisinopril (ZESTRIL) 5 MG tablet 30 tablet 1 1/25/2022  --   Sig - Route: Take 1 tablet (5 mg) by mouth daily - Oral   Sent to pharmacy as: Lisinopril 5 MG Oral Tablet (ZESTRIL)   Class: E-Prescribe   Order: [id number]   E-Prescribing Status: Receipt confirmed by pharmacy (1/25/2022  3:18 PM CST)           Aundrea Vega RN, BSN Nurse Triage Advisor 9:50 AM 1/27/2022

## 2022-02-08 ENCOUNTER — MYC REFILL (OUTPATIENT)
Dept: FAMILY MEDICINE | Facility: CLINIC | Age: 42
End: 2022-02-08
Payer: COMMERCIAL

## 2022-02-08 DIAGNOSIS — F90.0 ADHD, PREDOMINANTLY INATTENTIVE TYPE: ICD-10-CM

## 2022-02-10 RX ORDER — DEXTROAMPHETAMINE SACCHARATE, AMPHETAMINE ASPARTATE MONOHYDRATE, DEXTROAMPHETAMINE SULFATE AND AMPHETAMINE SULFATE 5; 5; 5; 5 MG/1; MG/1; MG/1; MG/1
20 CAPSULE, EXTENDED RELEASE ORAL DAILY
Qty: 30 CAPSULE | Refills: 0 | Status: SHIPPED | OUTPATIENT
Start: 2022-02-10 | End: 2022-03-10

## 2022-02-10 NOTE — TELEPHONE ENCOUNTER
Routing refill request to provider for review/approval because:  Controlled substance request    Last Written Prescription Date:  1/6/22  Last Fill Quantity: 30,  # refills: 0   Last office visit provider:  1/25/22     Requested Prescriptions   Pending Prescriptions Disp Refills     amphetamine-dextroamphetamine (ADDERALL XR) 20 MG 24 hr capsule 30 capsule 0     Sig: Take 1 capsule (20 mg) by mouth daily       There is no refill protocol information for this order          Ishaan Schwartz RN 02/10/22 7:31 AM

## 2022-02-21 DIAGNOSIS — I10 ESSENTIAL HYPERTENSION: ICD-10-CM

## 2022-02-22 NOTE — TELEPHONE ENCOUNTER
"Routing refill request to provider for review/approval because:  BP outside parameters.    Last Written Prescription Date:  1/25/2022  (Pt requests 90-day supply.)  Last Fill Quantity: 30,  # refills: 1   Last office visit provider:  1/25/2022     Requested Prescriptions   Pending Prescriptions Disp Refills     lisinopril (ZESTRIL) 5 MG tablet [Pharmacy Med Name: LISINOPRIL 5MG TABLETS] 90 tablet      Sig: TAKE 1 TABLET(5 MG) BY MOUTH DAILY       ACE Inhibitors (Including Combos) Protocol Failed - 2/21/2022  6:09 AM        Failed - Blood pressure under 140/90 in past 12 months     BP Readings from Last 3 Encounters:   01/25/22 (!) 140/90   07/13/21 (!) 148/90   01/04/21 (!) 160/100                 Passed - Recent (12 mo) or future (30 days) visit within the authorizing provider's specialty     Patient has had an office visit with the authorizing provider or a provider within the authorizing providers department within the previous 12 mos or has a future within next 30 days. See \"Patient Info\" tab in inbasket, or \"Choose Columns\" in Meds & Orders section of the refill encounter.              Passed - Medication is active on med list        Passed - Patient is age 18 or older        Passed - Normal serum creatinine on file in past 12 months     Recent Labs   Lab Test 01/25/22  1526   CR 1.01       Ok to refill medication if creatinine is low          Passed - Normal serum potassium on file in past 12 months     Recent Labs   Lab Test 01/25/22  1526   POTASSIUM 4.3                  Sammi Young RN 02/22/22 9:02 AM  "

## 2022-02-23 RX ORDER — LISINOPRIL 5 MG/1
TABLET ORAL
Qty: 90 TABLET | OUTPATIENT
Start: 2022-02-23

## 2022-02-23 RX ORDER — LISINOPRIL 5 MG/1
5 TABLET ORAL DAILY
Qty: 90 TABLET | Refills: 0 | Status: SHIPPED | OUTPATIENT
Start: 2022-02-23 | End: 2022-06-29

## 2022-03-10 ENCOUNTER — OFFICE VISIT (OUTPATIENT)
Dept: FAMILY MEDICINE | Facility: CLINIC | Age: 42
End: 2022-03-10
Payer: COMMERCIAL

## 2022-03-10 VITALS
SYSTOLIC BLOOD PRESSURE: 142 MMHG | DIASTOLIC BLOOD PRESSURE: 90 MMHG | WEIGHT: 174.3 LBS | OXYGEN SATURATION: 99 % | RESPIRATION RATE: 14 BRPM | HEIGHT: 70 IN | HEART RATE: 70 BPM | BODY MASS INDEX: 24.95 KG/M2

## 2022-03-10 DIAGNOSIS — I10 PRIMARY HYPERTENSION: Primary | ICD-10-CM

## 2022-03-10 DIAGNOSIS — E78.00 HYPERCHOLESTEROLEMIA: ICD-10-CM

## 2022-03-10 DIAGNOSIS — R21 RASH: ICD-10-CM

## 2022-03-10 DIAGNOSIS — F90.0 ADHD, PREDOMINANTLY INATTENTIVE TYPE: ICD-10-CM

## 2022-03-10 DIAGNOSIS — R74.8 ELEVATED LIVER ENZYMES: ICD-10-CM

## 2022-03-10 PROCEDURE — 99213 OFFICE O/P EST LOW 20 MIN: CPT | Performed by: FAMILY MEDICINE

## 2022-03-10 RX ORDER — DEXTROAMPHETAMINE SACCHARATE, AMPHETAMINE ASPARTATE MONOHYDRATE, DEXTROAMPHETAMINE SULFATE AND AMPHETAMINE SULFATE 5; 5; 5; 5 MG/1; MG/1; MG/1; MG/1
20 CAPSULE, EXTENDED RELEASE ORAL DAILY
Qty: 30 CAPSULE | Refills: 0 | Status: SHIPPED | OUTPATIENT
Start: 2022-03-10 | End: 2022-04-08

## 2022-03-10 NOTE — PROGRESS NOTES
Claudio was seen today for follow up.    Diagnoses and all orders for this visit:    Primary hypertension- Home BP readings are good so continue current antihypertensives. Recheck BMP in a few weeks.    ADHD, predominantly inattentive type- Stable.  F/U in 6 month.  -     amphetamine-dextroamphetamine (ADDERALL XR) 20 MG 24 hr capsule; Take 1 capsule (20 mg) by mouth daily    Rash- This looks like ecchymoses due to trauma but will check CBC.  -     CBC with platelets; Future    Hypercholesterolemia- LDL very high but pt doing a lot of lifestyle changes so will recheck in future then determine if he should start statin.    Elevated liver enzymes - Secondary to heavy Etoh use.  Will recheck in the next month.    F/U in 1 month for fasting labs.      SUBJECTIVE: Claudio Thacker is a 41 year old male who presents with the following:  Patient presents with:  Follow Up: BP    1) HTN - Pt was on metoprolol but BP still high so lisinopril 5 mg added.  He has had some hand numbness off / on when he was cold driving or getting out of car but no other side effects.  BP home readings - systolic 138, diastolic 81.  All readings under 140/ 90.    2) Elevated cholesterol - No previous LDL levels for comparison.  He has lost about 14 lbs and is exercising regularly.  Has changed his diet.    3) Elevated liver enzymes - He was drinking 4-5 alcoholic drinks a week.  He has cut back a lot and is only having 2-3 drinks a couple times a week.  He does not think he needs any CD evaluation or counseling. Feels he is able to cut back on alcohol on his own.    4) ADHD - He continues to do well on Adderall XR 20 mg daily.  No side effects.  Helps his concentration at work.    5) He has noticed red spots on the bottom of his toes on left foot.  He had this issue in the past.  No pain or itching.  He has been using a reclining bike for exercise and admits to banging his toes against bottom of foot rest.    SH:  with children.    Answers  "for HPI/ROS submitted by the patient on 3/10/2022  Do you check your blood pressure regularly outside of the clinic?: Yes  Are your blood pressures ever more than 140 on the top number (systolic) OR more than 90 on the bottom number (diastolic)? (For example, greater than 140/90): No  Are you following a low salt diet?: Yes  How many servings of fruits and vegetables do you eat daily?: 2-3  On average, how many sweetened beverages do you drink each day (Examples: soda, juice, sweet tea, etc.  Do NOT count diet or artificially sweetened beverages)?: 0  How many minutes a day do you exercise enough to make your heart beat faster?: 20 to 29  How many days a week do you exercise enough to make your heart beat faster?: 7  How many days per week do you miss taking your medication?: 0  What is the reason for your visit today?: Check up  When did your symptoms begin?: More than a month        OBJECTIVE: BP (!) 142/90 (BP Location: Left arm, Patient Position: Sitting, Cuff Size: Adult Regular)   Pulse 70   Resp 14   Ht 1.778 m (5' 10\")   Wt 79.1 kg (174 lb 4.8 oz)   SpO2 99%   BMI 25.01 kg/m   no distress  Feet: Left foot with purplish spots on volar aspect of toes.    BP Readings from Last 6 Encounters:   03/10/22 (!) 142/90   01/25/22 (!) 140/90   07/13/21 (!) 148/90   01/04/21 (!) 160/100   10/28/19 (!) 142/82   10/19/14 124/56     Wt Readings from Last 3 Encounters:   03/10/22 79.1 kg (174 lb 4.8 oz)   01/25/22 85.3 kg (188 lb 1.6 oz)   07/13/21 81.1 kg (178 lb 11.2 oz)     Last Comprehensive Metabolic Panel:  Sodium   Date Value Ref Range Status   01/25/2022 138 136 - 145 mmol/L Final     Potassium   Date Value Ref Range Status   01/25/2022 4.3 3.5 - 5.0 mmol/L Final     Chloride   Date Value Ref Range Status   01/25/2022 103 98 - 107 mmol/L Final     Carbon Dioxide (CO2)   Date Value Ref Range Status   01/25/2022 21 (L) 22 - 31 mmol/L Final     Anion Gap   Date Value Ref Range Status   01/25/2022 14 5 - 18 mmol/L " Final     Glucose   Date Value Ref Range Status   01/25/2022 85 70 - 125 mg/dL Final     Urea Nitrogen   Date Value Ref Range Status   01/25/2022 17 8 - 22 mg/dL Final     Creatinine   Date Value Ref Range Status   01/25/2022 1.01 0.70 - 1.30 mg/dL Final     GFR Estimate   Date Value Ref Range Status   01/25/2022 >90 >60 mL/min/1.73m2 Final     Comment:     Effective December 21, 2021 eGFRcr in adults is calculated using the 2021 CKD-EPI creatinine equation which includes age and gender (Sejal et al., NEJ, DOI: 10.1056/LEZAnh5993010)     Calcium   Date Value Ref Range Status   01/25/2022 9.9 8.5 - 10.5 mg/dL Final     Bilirubin Total   Date Value Ref Range Status   01/25/2022 2.7 (H) 0.0 - 1.0 mg/dL Final     Alkaline Phosphatase   Date Value Ref Range Status   01/25/2022 84 45 - 120 U/L Final     ALT   Date Value Ref Range Status   01/25/2022 68 (H) 0 - 45 U/L Final     AST   Date Value Ref Range Status   01/25/2022 41 (H) 0 - 40 U/L Final     LDL Cholesterol Direct   Date Value Ref Range Status   01/25/2022 198 (H) <=129 mg/dL Final             Catarina Mcfadden MD

## 2022-03-11 PROBLEM — E78.00 HYPERCHOLESTEROLEMIA: Status: ACTIVE | Noted: 2022-03-11

## 2022-03-11 PROBLEM — I10 PRIMARY HYPERTENSION: Status: ACTIVE | Noted: 2022-03-11

## 2022-04-08 ENCOUNTER — MYC REFILL (OUTPATIENT)
Dept: FAMILY MEDICINE | Facility: CLINIC | Age: 42
End: 2022-04-08
Payer: COMMERCIAL

## 2022-04-08 DIAGNOSIS — F90.0 ADHD, PREDOMINANTLY INATTENTIVE TYPE: ICD-10-CM

## 2022-04-11 RX ORDER — DEXTROAMPHETAMINE SACCHARATE, AMPHETAMINE ASPARTATE MONOHYDRATE, DEXTROAMPHETAMINE SULFATE AND AMPHETAMINE SULFATE 5; 5; 5; 5 MG/1; MG/1; MG/1; MG/1
20 CAPSULE, EXTENDED RELEASE ORAL DAILY
Qty: 30 CAPSULE | Refills: 0 | Status: SHIPPED | OUTPATIENT
Start: 2022-04-11 | End: 2022-05-09

## 2022-04-11 NOTE — TELEPHONE ENCOUNTER
Routing refill request to provider for review/approval because:  Controlled substance.     Last Written Prescription Date:  3/10/2022  Last Fill Quantity: 30,  # refills: 0   Last office visit provider:  3/10/2022     Requested Prescriptions   Pending Prescriptions Disp Refills     amphetamine-dextroamphetamine (ADDERALL XR) 20 MG 24 hr capsule 30 capsule 0     Sig: Take 1 capsule (20 mg) by mouth in the morning.       There is no refill protocol information for this order          Becky Rubalcava RN 04/11/22 10:54 AM

## 2022-05-09 ENCOUNTER — MYC REFILL (OUTPATIENT)
Dept: FAMILY MEDICINE | Facility: CLINIC | Age: 42
End: 2022-05-09
Payer: COMMERCIAL

## 2022-05-09 DIAGNOSIS — F90.0 ADHD, PREDOMINANTLY INATTENTIVE TYPE: ICD-10-CM

## 2022-05-11 NOTE — TELEPHONE ENCOUNTER
Routing refill request to provider for review/approval because:  This is a controlled medication    Last Written Prescription Date:  4/11/2022  Last Fill Quantity: 30,  # refills: 0   Last office visit provider:  3/10/2022     Requested Prescriptions   Pending Prescriptions Disp Refills     amphetamine-dextroamphetamine (ADDERALL XR) 20 MG 24 hr capsule 30 capsule 0     Sig: Take 1 capsule (20 mg) by mouth daily       There is no refill protocol information for this order          Lakia Sparks RN 05/11/22 5:42 PM

## 2022-05-12 RX ORDER — DEXTROAMPHETAMINE SACCHARATE, AMPHETAMINE ASPARTATE MONOHYDRATE, DEXTROAMPHETAMINE SULFATE AND AMPHETAMINE SULFATE 5; 5; 5; 5 MG/1; MG/1; MG/1; MG/1
20 CAPSULE, EXTENDED RELEASE ORAL DAILY
Qty: 30 CAPSULE | Refills: 0 | Status: SHIPPED | OUTPATIENT
Start: 2022-05-12 | End: 2022-06-08

## 2022-06-08 ENCOUNTER — MYC REFILL (OUTPATIENT)
Dept: FAMILY MEDICINE | Facility: CLINIC | Age: 42
End: 2022-06-08
Payer: COMMERCIAL

## 2022-06-08 DIAGNOSIS — F90.0 ADHD, PREDOMINANTLY INATTENTIVE TYPE: ICD-10-CM

## 2022-06-09 RX ORDER — DEXTROAMPHETAMINE SACCHARATE, AMPHETAMINE ASPARTATE MONOHYDRATE, DEXTROAMPHETAMINE SULFATE AND AMPHETAMINE SULFATE 5; 5; 5; 5 MG/1; MG/1; MG/1; MG/1
20 CAPSULE, EXTENDED RELEASE ORAL DAILY
Qty: 30 CAPSULE | Refills: 0 | Status: SHIPPED | OUTPATIENT
Start: 2022-06-09 | End: 2022-07-15

## 2022-06-09 NOTE — TELEPHONE ENCOUNTER
Routing refill request to provider for review/approval because:  Controlled substance request    Last Written Prescription Date:  5/12/22  Last Fill Quantity: 30,  # refills: 0   Last office visit provider:  3/10/22     Requested Prescriptions   Pending Prescriptions Disp Refills     amphetamine-dextroamphetamine (ADDERALL XR) 20 MG 24 hr capsule 30 capsule 0     Sig: Take 1 capsule (20 mg) by mouth daily       There is no refill protocol information for this order          Dilia Thurman RN 06/09/22 2:18 PM

## 2022-06-29 ENCOUNTER — MYC REFILL (OUTPATIENT)
Dept: FAMILY MEDICINE | Facility: CLINIC | Age: 42
End: 2022-06-29

## 2022-06-29 DIAGNOSIS — I10 ESSENTIAL HYPERTENSION: ICD-10-CM

## 2022-06-30 RX ORDER — LISINOPRIL 5 MG/1
5 TABLET ORAL DAILY
Qty: 90 TABLET | Refills: 0 | Status: SHIPPED | OUTPATIENT
Start: 2022-06-30 | End: 2022-09-15

## 2022-06-30 NOTE — TELEPHONE ENCOUNTER
"Routing refill request to provider for review/approval because:  BP not in range.    Last Written Prescription Date:  2/23/22  Last Fill Quantity: 90,  # refills: 0   Last office visit provider:  3/10/22     Requested Prescriptions   Pending Prescriptions Disp Refills     lisinopril (ZESTRIL) 5 MG tablet 90 tablet 0     Sig: Take 1 tablet (5 mg) by mouth daily       ACE Inhibitors (Including Combos) Protocol Failed - 6/30/2022  1:13 PM        Failed - Blood pressure under 140/90 in past 12 months     BP Readings from Last 3 Encounters:   03/10/22 (!) 142/90   01/25/22 (!) 140/90   07/13/21 (!) 148/90                 Passed - Recent (12 mo) or future (30 days) visit within the authorizing provider's specialty     Patient has had an office visit with the authorizing provider or a provider within the authorizing providers department within the previous 12 mos or has a future within next 30 days. See \"Patient Info\" tab in inbasket, or \"Choose Columns\" in Meds & Orders section of the refill encounter.              Passed - Medication is active on med list        Passed - Patient is age 18 or older        Passed - Normal serum creatinine on file in past 12 months     Recent Labs   Lab Test 01/25/22  1526   CR 1.01       Ok to refill medication if creatinine is low          Passed - Normal serum potassium on file in past 12 months     Recent Labs   Lab Test 01/25/22  1526   POTASSIUM 4.3                  Ishaan Schwartz RN 06/30/22 1:13 PM  "

## 2022-07-15 ENCOUNTER — MYC REFILL (OUTPATIENT)
Dept: FAMILY MEDICINE | Facility: CLINIC | Age: 42
End: 2022-07-15

## 2022-07-15 DIAGNOSIS — F90.0 ADHD, PREDOMINANTLY INATTENTIVE TYPE: ICD-10-CM

## 2022-07-15 RX ORDER — DEXTROAMPHETAMINE SACCHARATE, AMPHETAMINE ASPARTATE MONOHYDRATE, DEXTROAMPHETAMINE SULFATE AND AMPHETAMINE SULFATE 5; 5; 5; 5 MG/1; MG/1; MG/1; MG/1
20 CAPSULE, EXTENDED RELEASE ORAL DAILY
Qty: 30 CAPSULE | Refills: 0 | Status: SHIPPED | OUTPATIENT
Start: 2022-07-15 | End: 2022-09-12

## 2022-07-26 ENCOUNTER — LAB (OUTPATIENT)
Dept: LAB | Facility: CLINIC | Age: 42
End: 2022-07-26
Payer: COMMERCIAL

## 2022-07-26 DIAGNOSIS — R21 RASH: ICD-10-CM

## 2022-07-26 LAB
ERYTHROCYTE [DISTWIDTH] IN BLOOD BY AUTOMATED COUNT: 11.7 % (ref 10–15)
HCT VFR BLD AUTO: 46.3 % (ref 40–53)
HGB BLD-MCNC: 16.1 G/DL (ref 13.3–17.7)
MCH RBC QN AUTO: 32.7 PG (ref 26.5–33)
MCHC RBC AUTO-ENTMCNC: 34.8 G/DL (ref 31.5–36.5)
MCV RBC AUTO: 94 FL (ref 78–100)
PLATELET # BLD AUTO: 208 10E3/UL (ref 150–450)
RBC # BLD AUTO: 4.93 10E6/UL (ref 4.4–5.9)
WBC # BLD AUTO: 6.1 10E3/UL (ref 4–11)

## 2022-07-26 PROCEDURE — 85027 COMPLETE CBC AUTOMATED: CPT

## 2022-07-26 PROCEDURE — 36415 COLL VENOUS BLD VENIPUNCTURE: CPT

## 2022-07-27 ENCOUNTER — TELEPHONE (OUTPATIENT)
Dept: FAMILY MEDICINE | Facility: CLINIC | Age: 42
End: 2022-07-27

## 2022-07-27 DIAGNOSIS — I10 PRIMARY HYPERTENSION: Primary | ICD-10-CM

## 2022-07-27 DIAGNOSIS — E78.00 HYPERCHOLESTEROLEMIA: ICD-10-CM

## 2022-07-27 NOTE — TELEPHONE ENCOUNTER
Left message to call back for: pt  Information to relay to patient: Please relay MD's below to pt when he call back. xl

## 2022-07-27 NOTE — TELEPHONE ENCOUNTER
----- Message from Catarina Mcfadden MD sent at 2022  4:22 PM CDT -----  Please call pt to schedule fasting lab.  He should not be charged for another lab draw as his labs had  so not his fault he has to come in again.

## 2022-09-12 ENCOUNTER — MYC REFILL (OUTPATIENT)
Dept: FAMILY MEDICINE | Facility: CLINIC | Age: 42
End: 2022-09-12

## 2022-09-12 DIAGNOSIS — F90.0 ADHD, PREDOMINANTLY INATTENTIVE TYPE: ICD-10-CM

## 2022-09-12 RX ORDER — DEXTROAMPHETAMINE SACCHARATE, AMPHETAMINE ASPARTATE MONOHYDRATE, DEXTROAMPHETAMINE SULFATE AND AMPHETAMINE SULFATE 5; 5; 5; 5 MG/1; MG/1; MG/1; MG/1
20 CAPSULE, EXTENDED RELEASE ORAL DAILY
Qty: 30 CAPSULE | Refills: 0 | Status: SHIPPED | OUTPATIENT
Start: 2022-09-12 | End: 2022-10-11

## 2022-09-15 DIAGNOSIS — I10 ESSENTIAL HYPERTENSION: ICD-10-CM

## 2022-09-15 RX ORDER — LISINOPRIL 5 MG/1
TABLET ORAL
Qty: 90 TABLET | Refills: 0 | Status: SHIPPED | OUTPATIENT
Start: 2022-09-15 | End: 2022-12-13

## 2022-09-15 NOTE — TELEPHONE ENCOUNTER
"Routing refill request to provider for review/approval because:  BP >140/90 in past 12mos    Last Written Prescription Date: 6/30/22  Last Fill Quantity: 90,  # refills: 0  Last office visit provider: 3/10/22    Requested Prescriptions   Pending Prescriptions Disp Refills     lisinopril (ZESTRIL) 5 MG tablet [Pharmacy Med Name: LISINOPRIL 5MG TABLETS] 90 tablet 0     Sig: TAKE 1 TABLET(5 MG) BY MOUTH DAILY       ACE Inhibitors (Including Combos) Protocol Failed - 9/15/2022  6:11 AM        Failed - Blood pressure under 140/90 in past 12 months     BP Readings from Last 3 Encounters:   03/10/22 (!) 142/90   01/25/22 (!) 140/90   07/13/21 (!) 148/90                 Passed - Recent (12 mo) or future (30 days) visit within the authorizing provider's specialty     Patient has had an office visit with the authorizing provider or a provider within the authorizing providers department within the previous 12 mos or has a future within next 30 days. See \"Patient Info\" tab in inbasket, or \"Choose Columns\" in Meds & Orders section of the refill encounter.              Passed - Medication is active on med list        Passed - Patient is age 18 or older        Passed - Normal serum creatinine on file in past 12 months     Recent Labs   Lab Test 01/25/22  1526   CR 1.01       Ok to refill medication if creatinine is low          Passed - Normal serum potassium on file in past 12 months     Recent Labs   Lab Test 01/25/22  1526   POTASSIUM 4.3                Lenny Galo RN  [facility]     "

## 2022-10-11 DIAGNOSIS — F90.0 ADHD, PREDOMINANTLY INATTENTIVE TYPE: ICD-10-CM

## 2022-10-11 RX ORDER — DEXTROAMPHETAMINE SACCHARATE, AMPHETAMINE ASPARTATE MONOHYDRATE, DEXTROAMPHETAMINE SULFATE AND AMPHETAMINE SULFATE 5; 5; 5; 5 MG/1; MG/1; MG/1; MG/1
20 CAPSULE, EXTENDED RELEASE ORAL DAILY
Qty: 30 CAPSULE | Refills: 0 | Status: SHIPPED | OUTPATIENT
Start: 2022-10-11 | End: 2022-11-14

## 2022-11-14 ENCOUNTER — MYC REFILL (OUTPATIENT)
Dept: FAMILY MEDICINE | Facility: CLINIC | Age: 42
End: 2022-11-14

## 2022-11-14 DIAGNOSIS — F90.0 ADHD, PREDOMINANTLY INATTENTIVE TYPE: ICD-10-CM

## 2022-11-14 RX ORDER — DEXTROAMPHETAMINE SACCHARATE, AMPHETAMINE ASPARTATE MONOHYDRATE, DEXTROAMPHETAMINE SULFATE AND AMPHETAMINE SULFATE 5; 5; 5; 5 MG/1; MG/1; MG/1; MG/1
20 CAPSULE, EXTENDED RELEASE ORAL DAILY
Qty: 30 CAPSULE | Refills: 0 | Status: SHIPPED | OUTPATIENT
Start: 2022-11-14 | End: 2022-12-13

## 2022-12-26 ENCOUNTER — HEALTH MAINTENANCE LETTER (OUTPATIENT)
Age: 42
End: 2022-12-26

## 2023-01-12 ENCOUNTER — MYC REFILL (OUTPATIENT)
Dept: FAMILY MEDICINE | Facility: CLINIC | Age: 43
End: 2023-01-12

## 2023-01-12 DIAGNOSIS — F90.0 ADHD, PREDOMINANTLY INATTENTIVE TYPE: ICD-10-CM

## 2023-01-13 RX ORDER — DEXTROAMPHETAMINE SACCHARATE, AMPHETAMINE ASPARTATE MONOHYDRATE, DEXTROAMPHETAMINE SULFATE AND AMPHETAMINE SULFATE 5; 5; 5; 5 MG/1; MG/1; MG/1; MG/1
20 CAPSULE, EXTENDED RELEASE ORAL DAILY
Qty: 30 CAPSULE | Refills: 0 | Status: SHIPPED | OUTPATIENT
Start: 2023-01-13 | End: 2023-02-10

## 2023-01-19 ENCOUNTER — OFFICE VISIT (OUTPATIENT)
Dept: FAMILY MEDICINE | Facility: CLINIC | Age: 43
End: 2023-01-19
Payer: COMMERCIAL

## 2023-01-19 VITALS
BODY MASS INDEX: 24.29 KG/M2 | HEIGHT: 70 IN | OXYGEN SATURATION: 96 % | TEMPERATURE: 98.9 F | DIASTOLIC BLOOD PRESSURE: 88 MMHG | HEART RATE: 51 BPM | WEIGHT: 169.7 LBS | SYSTOLIC BLOOD PRESSURE: 138 MMHG

## 2023-01-19 DIAGNOSIS — F90.0 ADHD, PREDOMINANTLY INATTENTIVE TYPE: ICD-10-CM

## 2023-01-19 DIAGNOSIS — E78.00 HYPERCHOLESTEROLEMIA: ICD-10-CM

## 2023-01-19 DIAGNOSIS — Z11.59 NEED FOR HEPATITIS C SCREENING TEST: ICD-10-CM

## 2023-01-19 DIAGNOSIS — Z80.0 FAMILY HISTORY OF COLON CANCER: ICD-10-CM

## 2023-01-19 DIAGNOSIS — Z11.4 SCREENING FOR HIV (HUMAN IMMUNODEFICIENCY VIRUS): ICD-10-CM

## 2023-01-19 DIAGNOSIS — Z00.00 HEALTH CARE MAINTENANCE: Primary | ICD-10-CM

## 2023-01-19 DIAGNOSIS — I10 PRIMARY HYPERTENSION: ICD-10-CM

## 2023-01-19 LAB
ALBUMIN SERPL BCG-MCNC: 4.8 G/DL (ref 3.5–5.2)
ALP SERPL-CCNC: 82 U/L (ref 40–129)
ALT SERPL W P-5'-P-CCNC: 26 U/L (ref 10–50)
ANION GAP SERPL CALCULATED.3IONS-SCNC: 14 MMOL/L (ref 7–15)
AST SERPL W P-5'-P-CCNC: 33 U/L (ref 10–50)
BILIRUB SERPL-MCNC: 1.9 MG/DL
BUN SERPL-MCNC: 14.4 MG/DL (ref 6–20)
CALCIUM SERPL-MCNC: 10 MG/DL (ref 8.6–10)
CHLORIDE SERPL-SCNC: 100 MMOL/L (ref 98–107)
CHOLEST SERPL-MCNC: 246 MG/DL
CREAT SERPL-MCNC: 1.01 MG/DL (ref 0.67–1.17)
DEPRECATED HCO3 PLAS-SCNC: 25 MMOL/L (ref 22–29)
GFR SERPL CREATININE-BSD FRML MDRD: >90 ML/MIN/1.73M2
GLUCOSE SERPL-MCNC: 89 MG/DL (ref 70–99)
HDLC SERPL-MCNC: 51 MG/DL
LDLC SERPL CALC-MCNC: 173 MG/DL
NONHDLC SERPL-MCNC: 195 MG/DL
POTASSIUM SERPL-SCNC: 4.5 MMOL/L (ref 3.4–5.3)
PROT SERPL-MCNC: 7.5 G/DL (ref 6.4–8.3)
SODIUM SERPL-SCNC: 139 MMOL/L (ref 136–145)
TRIGL SERPL-MCNC: 112 MG/DL

## 2023-01-19 PROCEDURE — 87389 HIV-1 AG W/HIV-1&-2 AB AG IA: CPT | Performed by: FAMILY MEDICINE

## 2023-01-19 PROCEDURE — 80053 COMPREHEN METABOLIC PANEL: CPT | Performed by: FAMILY MEDICINE

## 2023-01-19 PROCEDURE — 80061 LIPID PANEL: CPT | Performed by: FAMILY MEDICINE

## 2023-01-19 PROCEDURE — 86803 HEPATITIS C AB TEST: CPT | Performed by: FAMILY MEDICINE

## 2023-01-19 PROCEDURE — 36415 COLL VENOUS BLD VENIPUNCTURE: CPT | Performed by: FAMILY MEDICINE

## 2023-01-19 PROCEDURE — 99396 PREV VISIT EST AGE 40-64: CPT | Performed by: FAMILY MEDICINE

## 2023-01-19 RX ORDER — TRIAMCINOLONE ACETONIDE 1 MG/G
OINTMENT TOPICAL
COMMUNITY
Start: 2023-01-03 | End: 2023-02-10

## 2023-01-19 ASSESSMENT — ENCOUNTER SYMPTOMS
CONSTIPATION: 0
CHILLS: 0
HEMATURIA: 0
NAUSEA: 0
HEARTBURN: 0
EYE PAIN: 0
PALPITATIONS: 0
HEMATOCHEZIA: 0
SHORTNESS OF BREATH: 0
DYSURIA: 0
DIARRHEA: 0
FEVER: 0
FREQUENCY: 0
SORE THROAT: 0
ARTHRALGIAS: 0
HEADACHES: 0
PARESTHESIAS: 1
DIZZINESS: 0
JOINT SWELLING: 0
ABDOMINAL PAIN: 0
COUGH: 0
MYALGIAS: 0
WEAKNESS: 0
NERVOUS/ANXIOUS: 0

## 2023-01-19 NOTE — LETTER
[facility]  01/19/23  Patient: Claudio Thacker  YOB: 1980  Medical Record Number: [id number]                                                                                  Non-Opioid Controlled Substance Agreement    This is an agreement between you and your provider regarding safe and appropriate use of controlled substances prescribed by your care team. Controlled substances are?medicines that can cause physical and mental dependence (abuse).     There are strict laws about having and using these medicines. We here at [facility] are  committed to working with you in your efforts to get better. To support you in this work, we'll help you schedule regular office appointments for medicine refills. If we must cancel or change your appointment for any reason, we'll make sure you have enough medicine to last until your next appointment.     As a Provider, I will:     Listen carefully to your concerns while treating you with respect.     Recommend a treatment plan that I believe is in your best interest and may involve therapies other than medicine.      Talk with you often about the possible benefits and the risk of harm of any medicine that we prescribe for you.    Assess the safety of this medicine and check how well it works.      Provide a plan on how to taper (discontinue or go off) using this medicine if the decision is made to stop its use.      ::  As a Patient, I understand controlled substances:       Are prescribed by my care provider to help me function or work and manage my condition(s).?    Are strong medicines and can cause serious side effects.       Need to be taken exactly as prescribed.?Combining controlled substances with certain medicines or chemicals (such as illegal drugs, alcohol, sedatives, sleeping pills, and benzodiazepines) can be dangerous or even fatal.? If I stop taking my medicines suddenly, I may have severe withdrawal symptoms.     The  risks, benefits, and side effects of these medicine(s) were explained to me. I agree that:    1. I will take part in other treatments as advised by my care team. This may be psychiatry or counseling, physical therapy, behavioral therapy, group treatment or a referral to specialist.    2. I will keep all my appointments and understand this is part of the monitoring of controlled substances.?My care team may require an office visit for EVERY controlled substance refill. If I miss appointments or don t follow instructions, my care team may stop my medicine    3. I will take my medicines as prescribed. I will not change the dose or schedule unless my care team tells me to. There will be no refills if I run out early.      4. I may be asked to come to the clinic and complete a urine drug test or complete a pill count. If I don t give a urine sample or participate in a pill count, the care team may stop my medicine.    5. I will only receive controlled substance prescriptions from this clinic. If I am treated by another provider, I will tell them that I am taking controlled substances and that I have a treatment agreement with this provider. I will inform my [facility] care team within one business day if I am given a prescription for any controlled substance by another healthcare provider. My [facility] care team can contact other providers and pharmacists about my use of any medicines.    6. It is up to me to make sure that I don't run out of my medicines on weekends or holidays.?If my care team is willing to refill my prescription without a visit, I must request refills only during office hours. Refills may take up to 3 business days to process. I will use one pharmacy to fill all my controlled substance prescriptions. I will notify the clinic about any changes to my insurance or medicine availability.    7. I am responsible for my prescriptions. If the medicine/prescription is lost, stolen or destroyed,  it will not be replaced.?I also agree not to share controlled substance medicines with anyone.     8. I am aware I should not use any illegal or recreational drugs. I agree not to drink alcohol unless my care team says I can.     9. If I enroll in the [place] Medical Cannabis program, I will tell my care team before my next refill.    10. I will tell my care team right away if I become pregnant, have a new medical problem treated outside of my regular clinic, or have a change in my medicines.     11. I understand that this medicine can affect my thinking, judgment and reaction time.? Alcohol and drugs affect the brain and body, which can affect the safety of my driving. Being under the influence of alcohol or drugs can affect my decision-making, behaviors, personal safety and the safety of others. Driving while impaired (DWI) can occur if a person is driving, operating or in physical control of a car, motorcycle, boat, snowmobile, ATV, motorbike, off-road vehicle or any other motor vehicle ([place] Statute 169A.20). I understand the risk if I choose to drive or operate any vehicle or machinery.    I understand that if I do not follow any of the conditions above, my prescriptions or treatment may be stopped or changed.   I agree that my provider, clinic care team and pharmacy may work with any city, state or federal law enforcement agency that investigates the misuse, sale or other diversion of my controlled medicine. I will allow my provider to discuss my care with, or share a copy of, this agreement with any other treating provider, pharmacy or emergency room where I receive care.     I have read this agreement and have asked questions about anything I did not understand.    ________________________________________________________  Patient Signature - Claudio Thacker     ___________________                   Date     ________________________________________________________  Provider Signature - Catarina LORD  Bogdan, MD       ___________________                   Date     ________________________________________________________  Witness Signature (required if provider not present while patient signing)          ___________________                   Date

## 2023-01-19 NOTE — PROGRESS NOTES
MALE ADULT PREVENTIVE EXAM      Problem List Items Addressed This Visit     ADHD, predominantly inattentive type     Doing well on Adderall.  Controlled substance contract signed today.  F/u in 6 months for recheck.         Primary hypertension     Stable on antihypertensives         Hypercholesterolemia     Recheck fasting lipids today        Other Visit Diagnoses     Health care maintenance    -  Primary    Relevant Orders    REVIEW OF HEALTH MAINTENANCE PROTOCOL ORDERS (Completed)    Screening for HIV (human immunodeficiency virus)        Relevant Orders    HIV Antigen Antibody Combo    Need for hepatitis C screening test        Relevant Orders    Hepatitis C Screen Reflex to HCV RNA Quant and Genotype    Family history of colon cancer        Relevant Orders    Colonoscopy Screening  Referral            CHIEF COMPLAINT:  Male preventive exam.    SUBJECTIVE:  Claudio Thacker is a 42 year old male.  He has the following complaints:  1) ADD- Doing well on Adderall.  Takes daily.  Helps him stay on task.  No loss of appetite or sleep issues on the medicine.  2) HTN- Taking metoprolol/ lisinopril.  BPs at home 120s/70s.  No side effects with meds.  3) His mother was diagnosed with colon cancer.       PAST MEDICAL & SURGICAL HISTORY:   Patient @[facility]@.  He  has no past surgical history on file..  He has a current medication list which includes the following prescription(s): amphetamine-dextroamphetamine, lisinopril, metoprolol succinate er, and triamcinolone..    Allergies   Allergen Reactions     Codeine Swelling     Inflammation of the joints       Primary hypertension  Stable on antihypertensives    Hypercholesterolemia  Recheck fasting lipids today    ADHD, predominantly inattentive type  Doing well on Adderall.  Controlled substance contract signed today.  F/u in 6 months for recheck.      HABITS & SOCIAL HISTORY:  with 2 children.  Works desk job.  He  reports that he has never smoked. He has  "quit using smokeless tobacco.  He  reports current alcohol use.      FAMILY HISTORY:  His family history is not on file.    PRIOR LABS:  Lab Results   Component Value Date    WBC 6.1 07/26/2022    HGB 16.1 07/26/2022    HCT 46.3 07/26/2022    MCV 94 07/26/2022     07/26/2022     01/25/2022    BUN 17 01/25/2022    CR 1.01 01/25/2022     Lab Results   Component Value Date    CHOL 262 (H) 01/25/2022    HDL 50 01/25/2022     No results found for: TSH  No components found for: GLUC      RISK BEHAVIORS AND HEALTH HABITS:  Answers for HPI/ROS submitted by the patient on 1/19/2023  Frequency of exercise:: 2-3 days/week  Getting at least 3 servings of Calcium per day:: Yes  Diet:: Low salt  Taking medications regularly:: Yes  Medication side effects:: None  Bi-annual eye exam:: NO  Dental care twice a year:: Yes  Sleep apnea or symptoms of sleep apnea:: None  abdominal pain: No  Blood in stool: No  Blood in urine: No  chest pain: No  chills: No  congestion: Yes  constipation: No  cough: No  diarrhea: No  dizziness: No  ear pain: No  eye pain: No  nervous/anxious: No  fever: No  frequency: No  genital sores: No  headaches: No  hearing loss: No  heartburn: No  arthralgias: No  joint swelling: No  peripheral edema: No  mood changes: Yes  myalgias: No  nausea: No  dysuria: No  palpitations: No  Skin sensation changes: Yes  sore throat: No  urgency: No  rash: No  shortness of breath: No  visual disturbance: No  weakness: No  impotence: No  penile discharge: No  Additional concerns today:: No  Duration of exercise:: 15-30 minutes        REVIEW OF SYSTEMS:  Complete head to toe review of systems is otherwise negative except as above.    OBJECTIVE:  VITAL SIGNS:  /88 (BP Location: Left arm, Patient Position: Sitting, Cuff Size: Adult Regular)   Pulse 51   Temp 98.9  F (37.2  C) (Oral)   Ht 1.772 m (5' 9.75\")   Wt 77 kg (169 lb 11.2 oz)   SpO2 96%   BMI 24.52 kg/m    GENERAL:  Patient alert, in NAD  EYES: " PERRLA. Extraoccular movements intact, pupils equal, reactive to light and accommodation.  Normal conjunctiva and lids.    ENT:  Hearing grossly normal.  Normal appearance to ears and nose.  Bilateral TM s, external canals, oropharynx normal. Normal lips, gums and teeth.    NECK:  Supple, without thyromegaly or mass.  RESP:  Clear to auscultation without crackles, wheezes or distress.  Normal respiratory effort.   CV:  Regular rate and rhythm without murmurs, rubs or gallops.  Normal pedal pulses.  No varicosities or edema.  ABDOMEN:  Soft, non-tender, without hepatosplenomegaly, masses, or hernias.    :  Normal scrotum.  Penis without lesions or discharge. Prostate is smooth, not enlarged with no nodules.  LYMPHATIC: No cervical lymphadenopathy.  No bruising.  NEURO:  CN II-XII intact, motor & sensory function all intact.  DTR and reflexes normal.  PSYCHIATRIC:  Alert & oriented with normal mood and affect.  Good judgment and insight.  SKIN:  Normal inspection and palpation.  MUSCULOSKELETAL: Normal gait and station.  - Spine / Ribs / Pelvis: Normal inspection, ROM, stability and strength: Spine, Head, Neck, Upper and Lower Extremities.          Catarina Mcfadden MD

## 2023-01-20 LAB
HCV AB SERPL QL IA: NONREACTIVE
HIV 1+2 AB+HIV1 P24 AG SERPL QL IA: NONREACTIVE

## 2023-01-20 NOTE — ASSESSMENT & PLAN NOTE
Doing well on Adderall.  Controlled substance contract signed today.  F/u in 6 months for recheck.

## 2023-02-10 ENCOUNTER — MYC REFILL (OUTPATIENT)
Dept: FAMILY MEDICINE | Facility: CLINIC | Age: 43
End: 2023-02-10
Payer: COMMERCIAL

## 2023-02-10 DIAGNOSIS — F90.0 ADHD, PREDOMINANTLY INATTENTIVE TYPE: ICD-10-CM

## 2023-02-10 DIAGNOSIS — R21 RASH: Primary | ICD-10-CM

## 2023-02-13 RX ORDER — TRIAMCINOLONE ACETONIDE 1 MG/G
OINTMENT TOPICAL 2 TIMES DAILY
Qty: 30 G | Refills: 1 | Status: SHIPPED | OUTPATIENT
Start: 2023-02-13 | End: 2023-05-02

## 2023-02-13 RX ORDER — DEXTROAMPHETAMINE SACCHARATE, AMPHETAMINE ASPARTATE MONOHYDRATE, DEXTROAMPHETAMINE SULFATE AND AMPHETAMINE SULFATE 5; 5; 5; 5 MG/1; MG/1; MG/1; MG/1
20 CAPSULE, EXTENDED RELEASE ORAL DAILY
Qty: 30 CAPSULE | Refills: 0 | Status: SHIPPED | OUTPATIENT
Start: 2023-02-13 | End: 2023-03-15

## 2023-02-28 ENCOUNTER — VIRTUAL VISIT (OUTPATIENT)
Dept: FAMILY MEDICINE | Facility: CLINIC | Age: 43
End: 2023-02-28
Payer: COMMERCIAL

## 2023-02-28 ENCOUNTER — MYC MEDICAL ADVICE (OUTPATIENT)
Dept: FAMILY MEDICINE | Facility: CLINIC | Age: 43
End: 2023-02-28

## 2023-02-28 DIAGNOSIS — U07.1 INFECTION DUE TO 2019 NOVEL CORONAVIRUS: Primary | ICD-10-CM

## 2023-02-28 DIAGNOSIS — I10 ESSENTIAL HYPERTENSION: ICD-10-CM

## 2023-02-28 DIAGNOSIS — E78.00 HYPERCHOLESTEROLEMIA: ICD-10-CM

## 2023-02-28 PROCEDURE — 99214 OFFICE O/P EST MOD 30 MIN: CPT | Mod: CS | Performed by: PREVENTIVE MEDICINE

## 2023-02-28 NOTE — PROGRESS NOTES
Kelvin is a 42 year old who is being evaluated via a billable telephone visit.      What phone number would you like to be contacted at? [phone]  How would you like to obtain your AVS? Cesia    Distant Location (provider location):  On-site    Assessment & Plan     Infection due to 2019 novel coronavirus  -symptoms for one day  -tested positive at home today  -meets criteria for treatment based on chronic medical conditions of Hypertension and Hyperlipidemia  -ED precautions: Chest pain, dehydration, sudden shortness of breath  -Hydration and monitor temperature   -renal function normal 1/23  - nirmatrelvir and ritonavir (PAXLOVID) therapy pack  Dispense: 30 tablet; Refill: 0    I ended our visit today by discussing the patient's diagnoses and recommended treatment. Please refer to today's diagnoses and orders for further details. I briefly discussed the pathophysiology of these conditions and outlined their expected course. I discussed the warning symptoms and signs that indicate an atypical course that would need urgent or emergent care. I also discussed self care strategies for symptom relief.  Common side effects of medications prescribed at this visit were discussed with the patient. Severe side effects, including current applicable black box warnings, were discussed.     Essential hypertension  -continue medication   -drug interactions reviewed between anti hypertensive and Paxlovid, no significant interactions     Hypercholesterolemia  -not on statins       Prescription drug management  15 minutes spent on the date of the encounter doing chart review, history and exam, documentation and further activities per the note       Return in about 3 days (around 3/3/2023) if symptoms worsen or fail to improve.    Neva Jordan MD MPH    [facility]   Kelvin is a 42 year old presenting for the following health issues:  Covid Concern    HPI       Renal function normal 1/19/23    Date of positive test 2/28/23    COVID-19 Symptom Review  How many days ago did these symptoms start? 1 day  Tmax 101 F  Not short of breath  Lots of drainage   No emesis   Has been hydrated.     Are any of the following symptoms significant for you?    New or worsening difficulty breathing? No    Worsening cough? Yes, I am coughing up mucus.    Fever or chills? Yes, I felt feverish or had chills.    Headache: YES- behind eyes, near temples    Sore throat: No    Chest pain: No    Diarrhea: YES- a little bit    Body aches? YES    What treatments has patient tried? None  Does patient live in a nursing home, group home, or shelter? No  Does patient have a way to get food/medications during quarantine? Yes, I have a friend or family member who can help me.    Hypertension:  -taking medication as prescribed    Review of Systems   Constitutional, HEENT, cardiovascular, pulmonary, gi and gu systems are negative, except as otherwise noted.      Objective       Vitals:  No vitals were obtained today due to virtual visit.    Physical Exam   healthy, alert and no distress  PSYCH: Alert and oriented times 3; coherent speech, normal   rate and volume, able to articulate logical thoughts, able   to abstract reason, no tangential thoughts, no hallucinations   or delusions  His affect is normal  RESP: No cough, no audible wheezing, able to talk in full sentences  Remainder of exam unable to be completed due to telephone visits    Office Visit on 01/19/2023   Component Date Value Ref Range Status     Sodium 01/19/2023 139  136 - 145 mmol/L Final     Potassium 01/19/2023 4.5  3.4 - 5.3 mmol/L Final     Chloride 01/19/2023 100  98 - 107 mmol/L Final     Carbon Dioxide (CO2) 01/19/2023 25  22 - 29 mmol/L Final     Anion Gap 01/19/2023 14  7 - 15 mmol/L Final     Urea Nitrogen 01/19/2023 14.4  6.0 - 20.0 mg/dL Final     Creatinine 01/19/2023 1.01  0.67 - 1.17 mg/dL Final     Calcium 01/19/2023 10.0  8.6 - 10.0 mg/dL Final     Glucose  01/19/2023 89  70 - 99 mg/dL Final     Alkaline Phosphatase 01/19/2023 82  40 - 129 U/L Final     AST 01/19/2023 33  10 - 50 U/L Final     ALT 01/19/2023 26  10 - 50 U/L Final     Protein Total 01/19/2023 7.5  6.4 - 8.3 g/dL Final     Albumin 01/19/2023 4.8  3.5 - 5.2 g/dL Final     Bilirubin Total 01/19/2023 1.9 (H)  <=1.2 mg/dL Final     GFR Estimate 01/19/2023 >90  >60 mL/min/1.73m2 Final    Effective December 21, 2021 eGFRcr in adults is calculated using the 2021 CKD-EPI creatinine equation which includes age and gender (Sejal et al., NEJM, DOI: 10.1056/PFQWyl6112737)     Cholesterol 01/19/2023 246 (H)  <200 mg/dL Final     Triglycerides 01/19/2023 112  <150 mg/dL Final     Direct Measure HDL 01/19/2023 51  >=40 mg/dL Final     LDL Cholesterol Calculated 01/19/2023 173 (H)  <=100 mg/dL Final     Non HDL Cholesterol 01/19/2023 195 (H)  <130 mg/dL Final     HIV Antigen Antibody Combo 01/19/2023 Nonreactive  Nonreactive Final    HIV-1 p24 Ag & HIV-1/HIV-2 Ab Not Detected     Hepatitis C Antibody 01/19/2023 Nonreactive  Nonreactive Final           Phone call duration: 4 minutes

## 2023-02-28 NOTE — PATIENT INSTRUCTIONS
At [facility], we strive to deliver an exceptional experience to you, every time we see you. If you receive a survey, please complete it as we do value your feedback.  If you have MyChart, you can expect to receive results automatically within 24 hours of their completion.  Your provider will send a note interpreting your results as well.   If you do not have MyChart, you should receive your results in about a week by mail.    Your care team:                            Family Medicine Internal Medicine   MD Yasmany Hermosillo MD Shantel Branch-Fleming, MD Srinivasa Vaka, MD Katya Belousova, PAJOSE ENRIQUE Delgadillo CNP, MD ([facility]   Luis Gaines, MD Radha Bryant MD Amelia Massimini APRN KARLA Servin APRN MD Mati Bravo MD          Clinic hours: Monday - Thursday 7 am-6 pm; Fridays 7 am-5 pm.   Urgent care: Monday - Friday 10 am- 8 pm; Saturday and Sunday 9 am-5 pm.    Clinic: [phone]       [facility]: Monday - Thursday 8 am - 7 pm; Friday 8 am - 6 pm  [facility]: [phone]

## 2023-02-28 NOTE — TELEPHONE ENCOUNTER
RN COVID TREATMENT VISIT  02/28/23      The patient has been triaged and does not require a higher level of care.    Claudio Thacker  42 year old  Current weight? 169lbs    Has the patient been seen by a primary care provider at an [facility] or [facility] within the past two years? Yes.   Have you been in close proximity to/do you have a known exposure to a person with a confirmed case of influenza? No.     General treatment eligibility:  Date of positive COVID test (PCR or at home)?  2/28/23    Are you or have you been hospitalized for this COVID-19 infection? No.   Have you received monoclonal antibodies or antiviral treatment for COVID-19 since this positive test? No.   Do you have any of the following conditions that place you at risk of being very sick from COVID-19?   - Heart conditions such as cardiomyopathies, congenital heart defects, coronary artery disease, heart arrhythmias, heart failure, hypertension, valve disorders   - Mental health disorders including mood disorders, depression, schizophrenia spectrum disorders   Yes, patient has at least one high risk condition as noted above.   2/2  Current COVID symptoms:   - fatigue  Yes. Patient has at least one symptom as selected.     How many days since symptoms started? 5 days or less. Established patient, 12 years or older weighing at least 88.2 lbs, who has symptoms that started in the past 5 days, has not been hospitalized nor received treatment already, and is at risk for being very sick from COVID-19.     Treatment eligibility by RN:    Are you currently pregnant or nursing? No    Do you have a clinically significant hypersensitivity to nirmatrelvir or ritonavir, or toxic epidermal necrolysis (TEN) or Keyes-Jefferson Syndrome? No    Do you have a history of hepatitis, any hepatic impairment on the Problem List (such as Child-Santiago Class C, cirrhosis, fatty liver disease, alcoholic liver disease), or was the last liver lab  (hepatic panel, ALT, AST, ALK Phos, bilirubin) elevated in the past 6 months? YES    Do you have any history of severe renal impairment (eGFR < 30mL/min)? No    Is patient eligible to continue? No, patient does not meet all eligibility requirements for the RN COVID treatment (as denoted by yes response(s) above). Patient informed they will need a virtual provider visit to assess treatment options.   Pt scheduled for appointment at 4:20pm today.  Beulah Kwan RN

## 2023-03-14 DIAGNOSIS — I10 ESSENTIAL HYPERTENSION: ICD-10-CM

## 2023-03-14 RX ORDER — LISINOPRIL 5 MG/1
TABLET ORAL
Qty: 90 TABLET | Refills: 2 | Status: SHIPPED | OUTPATIENT
Start: 2023-03-14 | End: 2023-11-01

## 2023-03-15 ENCOUNTER — MYC REFILL (OUTPATIENT)
Dept: FAMILY MEDICINE | Facility: CLINIC | Age: 43
End: 2023-03-15
Payer: COMMERCIAL

## 2023-03-15 DIAGNOSIS — F90.0 ADHD, PREDOMINANTLY INATTENTIVE TYPE: ICD-10-CM

## 2023-03-15 NOTE — TELEPHONE ENCOUNTER
"Last Written Prescription Date:  12/15/22  Last Fill Quantity: 90,  # refills: 0   Last office visit provider:  1/19/23     Requested Prescriptions   Pending Prescriptions Disp Refills     lisinopril (ZESTRIL) 5 MG tablet [Pharmacy Med Name: LISINOPRIL 5MG TABLETS] 90 tablet 0     Sig: TAKE 1 TABLET(5 MG) BY MOUTH DAILY       ACE Inhibitors (Including Combos) Protocol Passed - 3/14/2023  6:12 AM        Passed - Blood pressure under 140/90 in past 12 months     BP Readings from Last 3 Encounters:   01/19/23 138/88   03/10/22 (!) 142/90   01/25/22 (!) 140/90                 Passed - Recent (12 mo) or future (30 days) visit within the authorizing provider's specialty     Patient has had an office visit with the authorizing provider or a provider within the authorizing providers department within the previous 12 mos or has a future within next 30 days. See \"Patient Info\" tab in inbasket, or \"Choose Columns\" in Meds & Orders section of the refill encounter.              Passed - Medication is active on med list        Passed - Patient is age 18 or older        Passed - Normal serum creatinine on file in past 12 months     Recent Labs   Lab Test 01/19/23  1600   CR 1.01       Ok to refill medication if creatinine is low          Passed - Normal serum potassium on file in past 12 months     Recent Labs   Lab Test 01/19/23  1600   POTASSIUM 4.5                  Jhoana Alvarez RN 03/14/23 9:52 PM  "

## 2023-03-16 RX ORDER — DEXTROAMPHETAMINE SACCHARATE, AMPHETAMINE ASPARTATE MONOHYDRATE, DEXTROAMPHETAMINE SULFATE AND AMPHETAMINE SULFATE 5; 5; 5; 5 MG/1; MG/1; MG/1; MG/1
20 CAPSULE, EXTENDED RELEASE ORAL DAILY
Qty: 30 CAPSULE | Refills: 0 | Status: SHIPPED | OUTPATIENT
Start: 2023-03-16 | End: 2023-04-18

## 2023-03-16 NOTE — TELEPHONE ENCOUNTER
Routing refill request to provider for review/approval because:  Drug not [place] [facility] [place] Controlled substance    Last Written Prescription Date:  2/13/23  Last Fill Quantity: 30,  # refills: 0   Last office visit provider:  2/28/23     Requested Prescriptions   Pending Prescriptions Disp Refills     amphetamine-dextroamphetamine (ADDERALL XR) 20 MG 24 hr capsule 30 capsule 0     Sig: Take 1 capsule (20 mg) by mouth daily       There is no refill protocol information for this order          SHARMIN RUIZ RN 03/16/23 9:43 AM

## 2023-04-18 ENCOUNTER — MYC REFILL (OUTPATIENT)
Dept: FAMILY MEDICINE | Facility: CLINIC | Age: 43
End: 2023-04-18
Payer: COMMERCIAL

## 2023-04-18 DIAGNOSIS — F90.0 ADHD, PREDOMINANTLY INATTENTIVE TYPE: ICD-10-CM

## 2023-04-19 RX ORDER — DEXTROAMPHETAMINE SACCHARATE, AMPHETAMINE ASPARTATE MONOHYDRATE, DEXTROAMPHETAMINE SULFATE AND AMPHETAMINE SULFATE 5; 5; 5; 5 MG/1; MG/1; MG/1; MG/1
20 CAPSULE, EXTENDED RELEASE ORAL DAILY
Qty: 30 CAPSULE | Refills: 0 | Status: SHIPPED | OUTPATIENT
Start: 2023-04-19 | End: 2023-05-24

## 2023-04-27 ENCOUNTER — MYC MEDICAL ADVICE (OUTPATIENT)
Dept: FAMILY MEDICINE | Facility: CLINIC | Age: 43
End: 2023-04-27
Payer: COMMERCIAL

## 2023-05-02 ENCOUNTER — OFFICE VISIT (OUTPATIENT)
Dept: FAMILY MEDICINE | Facility: CLINIC | Age: 43
End: 2023-05-02
Payer: COMMERCIAL

## 2023-05-02 VITALS
HEART RATE: 58 BPM | OXYGEN SATURATION: 98 % | DIASTOLIC BLOOD PRESSURE: 91 MMHG | BODY MASS INDEX: 25.05 KG/M2 | HEIGHT: 70 IN | SYSTOLIC BLOOD PRESSURE: 143 MMHG | RESPIRATION RATE: 16 BRPM | WEIGHT: 175 LBS

## 2023-05-02 DIAGNOSIS — R21 RASH: Primary | ICD-10-CM

## 2023-05-02 DIAGNOSIS — I10 PRIMARY HYPERTENSION: ICD-10-CM

## 2023-05-02 DIAGNOSIS — E55.9 VITAMIN D DEFICIENCY: ICD-10-CM

## 2023-05-02 LAB
ANION GAP SERPL CALCULATED.3IONS-SCNC: 14 MMOL/L (ref 7–15)
BASOPHILS # BLD AUTO: 0.1 10E3/UL (ref 0–0.2)
BASOPHILS NFR BLD AUTO: 1 %
BUN SERPL-MCNC: 14.8 MG/DL (ref 6–20)
CALCIUM SERPL-MCNC: 9.9 MG/DL (ref 8.6–10)
CHLORIDE SERPL-SCNC: 101 MMOL/L (ref 98–107)
CREAT SERPL-MCNC: 1.01 MG/DL (ref 0.67–1.17)
CRP SERPL-MCNC: <3 MG/L
DEPRECATED HCO3 PLAS-SCNC: 25 MMOL/L (ref 22–29)
EOSINOPHIL # BLD AUTO: 0 10E3/UL (ref 0–0.7)
EOSINOPHIL NFR BLD AUTO: 1 %
ERYTHROCYTE [DISTWIDTH] IN BLOOD BY AUTOMATED COUNT: 11.8 % (ref 10–15)
ERYTHROCYTE [SEDIMENTATION RATE] IN BLOOD BY WESTERGREN METHOD: 8 MM/HR (ref 0–15)
GFR SERPL CREATININE-BSD FRML MDRD: >90 ML/MIN/1.73M2
GLUCOSE SERPL-MCNC: 100 MG/DL (ref 70–99)
HCT VFR BLD AUTO: 47.9 % (ref 40–53)
HGB BLD-MCNC: 16.7 G/DL (ref 13.3–17.7)
IMM GRANULOCYTES # BLD: 0 10E3/UL
IMM GRANULOCYTES NFR BLD: 0 %
LYMPHOCYTES # BLD AUTO: 1.6 10E3/UL (ref 0.8–5.3)
LYMPHOCYTES NFR BLD AUTO: 26 %
MCH RBC QN AUTO: 32.7 PG (ref 26.5–33)
MCHC RBC AUTO-ENTMCNC: 34.9 G/DL (ref 31.5–36.5)
MCV RBC AUTO: 94 FL (ref 78–100)
MONOCYTES # BLD AUTO: 0.5 10E3/UL (ref 0–1.3)
MONOCYTES NFR BLD AUTO: 8 %
NEUTROPHILS # BLD AUTO: 4 10E3/UL (ref 1.6–8.3)
NEUTROPHILS NFR BLD AUTO: 66 %
PLATELET # BLD AUTO: 188 10E3/UL (ref 150–450)
POTASSIUM SERPL-SCNC: 4.4 MMOL/L (ref 3.4–5.3)
RBC # BLD AUTO: 5.11 10E6/UL (ref 4.4–5.9)
SODIUM SERPL-SCNC: 140 MMOL/L (ref 136–145)
WBC # BLD AUTO: 6.1 10E3/UL (ref 4–11)

## 2023-05-02 PROCEDURE — 36415 COLL VENOUS BLD VENIPUNCTURE: CPT | Performed by: FAMILY MEDICINE

## 2023-05-02 PROCEDURE — 82306 VITAMIN D 25 HYDROXY: CPT | Performed by: FAMILY MEDICINE

## 2023-05-02 PROCEDURE — 85025 COMPLETE CBC W/AUTO DIFF WBC: CPT | Performed by: FAMILY MEDICINE

## 2023-05-02 PROCEDURE — 80048 BASIC METABOLIC PNL TOTAL CA: CPT | Performed by: FAMILY MEDICINE

## 2023-05-02 PROCEDURE — 99213 OFFICE O/P EST LOW 20 MIN: CPT | Performed by: FAMILY MEDICINE

## 2023-05-02 PROCEDURE — 85652 RBC SED RATE AUTOMATED: CPT | Performed by: FAMILY MEDICINE

## 2023-05-02 PROCEDURE — 86140 C-REACTIVE PROTEIN: CPT | Performed by: FAMILY MEDICINE

## 2023-05-02 NOTE — PROGRESS NOTES
"  Assessment & Plan   Problem List Items Addressed This Visit     Primary hypertension    Rash - Primary    Relevant Orders    CBC with platelets and differential (Completed)    Basic metabolic panel  (Ca, Cl, CO2, Creat, Gluc, K, Na, BUN)    ESR: Erythrocyte sedimentation rate    CRP, inflammation    Adult Dermatology Referral   Other Visit Diagnoses     Vitamin D deficiency        Relevant Orders    Vitamin D Deficiency         Check BP at home.  May be elevated due to steroids.  Call if BP readings higher than 140/90 consistently.           BMI:   Estimated body mass index is 25.29 kg/m  as calculated from the following:    Height as of this encounter: 1.772 m (5' 9.75\").    Weight as of this encounter: 79.4 kg (175 lb).           Catarina Mcfadden MD  [facility] HAMZAH Warren is a 42 year old, presenting for the following health issues:  Derm Problem (Rash on skin for 3 month)        5/2/2023    12:10 PM   Additional Questions   Roomed by toby     History of Present Illness       Reason for visit:  Rash  Symptom onset:  More than a month    He eats 2-3 servings of fruits and vegetables daily.He consumes 3 sweetened beverage(s) daily.He exercises with enough effort to increase his heart rate 10 to 19 minutes per day.  He exercises with enough effort to increase his heart rate 5 days per week.   He is taking medications regularly.     1) He has had a rash between shoulder blades that was itchy.  Itch got worse so was seen in [facility] and steroid ointment helped.  He got COVID and was on Paxlovid so stopped the steroid.  Then rash went to legs/ arms.  Went back to [facility] then took a 30 day course of prednisone.  Helped when he was at the beginning of the steroid but then came back.  No new personal care products.  No new meds.  Has been under stress.      2) HTN - /90 at home.  Taking metoprolol and lisinopril.      Patient Active Problem List   Diagnosis     " "Esophageal reflux     Allergic Rhinitis     ADHD, predominantly inattentive type     Panic attack     Anxiety     Benign familial tremor     Controlled substance agreement signed     Primary hypertension     Hypercholesterolemia     Rash          Review of Systems         Objective    BP (!) 143/91 (BP Location: Left arm, Patient Position: Sitting, Cuff Size: Adult Regular)   Pulse 58   Resp 16   Ht 1.772 m (5' 9.75\")   Wt 79.4 kg (175 lb)   SpO2 98%   BMI 25.29 kg/m    Body mass index is 25.29 kg/m .  Physical Exam   Skin: Scattered pink scaly macules, some with central clearing on trunk/ arms/ legs.      BP Readings from Last 6 Encounters:   05/02/23 (!) 143/91   01/19/23 138/88   03/10/22 (!) 142/90   01/25/22 (!) 140/90   07/13/21 (!) 148/90   01/04/21 (!) 160/100         Catarina Mcfadden MD     "

## 2023-05-03 ENCOUNTER — TELEPHONE (OUTPATIENT)
Dept: DERMATOLOGY | Facility: CLINIC | Age: 43
End: 2023-05-03
Payer: COMMERCIAL

## 2023-05-03 LAB — DEPRECATED CALCIDIOL+CALCIFEROL SERPL-MC: 17 UG/L (ref 20–75)

## 2023-05-03 NOTE — TELEPHONE ENCOUNTER
This encounter is being sent to inform the clinic that this patient has a referral from Dr Catarina Mcfadden for the diagnoses of Rash [R21] and has requested that this patient be seen within 1-2 weeks.  Based on the availability of our provider(s), we are unable to accommodate this request.      Were all sites offered this patient?  Yes, pt is also willing to schedule at [place] or [place] if no openings within requested time frame, thanks!      Does scheduling algorithm request to schedule next available?  Patient has been scheduled for the first available opening with DUY Mix on 10/30.  We have informed the patient that the clinic will review their referral and reach out if a sooner appointment is medically necessary.

## 2023-05-11 ENCOUNTER — OFFICE VISIT (OUTPATIENT)
Dept: DERMATOLOGY | Facility: CLINIC | Age: 43
End: 2023-05-11
Attending: FAMILY MEDICINE
Payer: COMMERCIAL

## 2023-05-11 DIAGNOSIS — L30.0 NUMMULAR DERMATITIS: Primary | ICD-10-CM

## 2023-05-11 PROCEDURE — 99203 OFFICE O/P NEW LOW 30 MIN: CPT | Performed by: DERMATOLOGY

## 2023-05-11 RX ORDER — MOMETASONE FUROATE 1 MG/G
OINTMENT TOPICAL 2 TIMES DAILY
Qty: 90 G | Refills: 3 | Status: SHIPPED | OUTPATIENT
Start: 2023-05-11

## 2023-05-11 ASSESSMENT — PAIN SCALES - GENERAL: PAINLEVEL: NO PAIN (0)

## 2023-05-11 NOTE — NURSING NOTE
Dermatology Rooming Note    Claudio Thacker's goals for this visit include:   Chief Complaint   Patient presents with     Rash     Patient reports rash on their shoulder/back that has been ongoing for several months. The patient reports use of steroid cream with some improvement. The patient reports new spreading of the rash to their lower legs following Covid illness.      Juany Barrett LPN

## 2023-05-11 NOTE — PROGRESS NOTES
[facility] Dermatology Note  Encounter Date: May 11, 2023  Office Visit     Dermatology Problem List:  1.  Nummular dermatitis  - current: mometasone 0.1% ointment  - Previous Tx: Triamcinolone 0.1% ointment, prednisone    ____________________________________________    Assessment & Plan:  1. Nummular dermatitis - improving  Circular erythematous plaques with fine scale on exam consistent with the nummular dermatitis. Per patient, partial response observed with triamcinolone 0.1% external ointment cream and complete clearance observed during a 30 course of oral prednisone. Discussed need for stronger topical steroid in order to clear rash. Discussed side effects and anticipated timeline for improvement. Will plan to start mometasone. Patient amenable to plan.  - Mometasone 0.1% topical ointment apply twice daily to affected areas         Procedures Performed:   None    Follow-up:  Return for new or changing lesions     Staff and medical student:     Laurie Alvarado MS4    Staff Physician:  I was present with the medical student who participated in the service and in the documentation of the note. I have verified the history and personally performed the physical exam and medical decision making. I edited the assessment and plan of care as documented in the note.     Bienvenido Bullock MD   of Dermatology  Department of Dermatology  [facility]  ____________________________________________    CC: Rash (Patient reports rash on their shoulder/back that has been ongoing for several months. The patient reports use of steroid cream with some improvement. The patient reports new spreading of the rash to their lower legs following Covid illness. )      HPI:  Mr. Claudio Thacker is a(n) 42 year old male who presents today as a new patient for evaluation of rash.     Patient reports noticing a lesion on his upper right back in January 2023.  He presented to  "[facility] at which time he was provided topical triamcinolone.  He reports partial improvement in the appearance of his rash during this time.  He was susequently diagnosed with COVID in February 2023 and treated with Paxlovid.  During his episode of COVID he experienced erruption of the rash from the back to all four extremeties.  He returned to [facility] and was provided a 30-day course of oral prednisone.  He reports visible improvement in the rash during his steroid course, however once the course was complete, he reports the rash returned. He visited his PCP for additional guidance at which time he was referred to dermatology.    Today, he reports pruritus, mild scale, burning and occasional bleeding associated with the rash.  He has not noticed any indication of superimposed infection.  He states that the rash feels like \"a bad case of poison ivy\".    Patient denies history of asthma and eczema in childhood, though endorses seasonal allergies.  He currently takes Allegra twice daily.    Patient is otherwise feeling well, without additional concerns.    As noted in HPI, otherwise negative.    Labs:  None reviewed.    Physical Exam:  Vitals: There were no vitals taken for this visit.  SKIN: Focused examination of trunk, upper and lower extremities was performed.  - Kenner shaped erythematous plaques with fine scale on the trunk and extremities  - Kenner shaped hyperpigmented macules on the trunk and extremities  - No other lesions of concern on areas examined.     Medications:  Current Outpatient Medications   Medication     mometasone (ELOCON) 0.1 % external ointment     amphetamine-dextroamphetamine (ADDERALL XR) 20 MG 24 hr capsule     lisinopril (ZESTRIL) 5 MG tablet     metoprolol succinate ER (TOPROL XL) 100 MG 24 hr tablet     No current facility-administered medications for this visit.      Past Medical/Surgical History:   Patient Active Problem List   Diagnosis     Esophageal reflux     Allergic " Rhinitis     ADHD, predominantly inattentive type     Panic attack     Anxiety     Benign familial tremor     Controlled substance agreement signed     Primary hypertension     Hypercholesterolemia     Rash     Past Medical History:   Diagnosis Date     Allergic rhinitis, cause unspecified     Created by Conversion      Esophageal reflux     Created by Conversion        CC Catarina Mcfadden MD  [address] [place] [address] on close of this encounter.

## 2023-05-11 NOTE — LETTER
5/11/2023       RE: Claudio Thacker  [address]  [place] [address]     Dear Colleague,    Thank you for referring your patient, Claudio Thacker, to the [facility] [place] [facility]. Please see a copy of my visit note below.    [facility] Dermatology Note  Encounter Date: May 11, 2023  Office Visit     Dermatology Problem List:  1.  Nummular dermatitis  - current: mometasone 0.1% ointment  - Previous Tx: Triamcinolone 0.1% ointment, prednisone    ____________________________________________    Assessment & Plan:  1. Nummular dermatitis - improving  Circular erythematous plaques with fine scale on exam consistent with the nummular dermatitis. Per patient, partial response observed with triamcinolone 0.1% external ointment cream and complete clearance observed during a 30 course of oral prednisone. Discussed need for stronger topical steroid in order to clear rash. Discussed side effects and anticipated timeline for improvement. Will plan to start mometasone. Patient amenable to plan.  - Mometasone 0.1% topical ointment apply twice daily to affected areas         Procedures Performed:   None    Follow-up:  Return for new or changing lesions     Staff and medical student:     Laurie Alvarado MS4    Staff Physician:  I was present with the medical student who participated in the service and in the documentation of the note. I have verified the history and personally performed the physical exam and medical decision making. I edited the assessment and plan of care as documented in the note.     Bienvenido Bullock MD   of Dermatology  Department of Dermatology  [facility]  ____________________________________________    CC: Rash (Patient reports rash on their shoulder/back that has been ongoing for several months. The patient reports use of steroid cream with some  "improvement. The patient reports new spreading of the rash to their lower legs following Covid illness. )      HPI:  Mr. Claudio Thacker is a(n) 42 year old male who presents today as a new patient for evaluation of rash.     Patient reports noticing a lesion on his upper right back in January 2023.  He presented to [facility] at which time he was provided topical triamcinolone.  He reports partial improvement in the appearance of his rash during this time.  He was susequently diagnosed with COVID in February 2023 and treated with Paxlovid.  During his episode of COVID he experienced erruption of the rash from the back to all four extremeties.  He returned to [facility] and was provided a 30-day course of oral prednisone.  He reports visible improvement in the rash during his steroid course, however once the course was complete, he reports the rash returned. He visited his PCP for additional guidance at which time he was referred to dermatology.    Today, he reports pruritus, mild scale, burning and occasional bleeding associated with the rash.  He has not noticed any indication of superimposed infection.  He states that the rash feels like \"a bad case of poison ivy\".    Patient denies history of asthma and eczema in childhood, though endorses seasonal allergies.  He currently takes Allegra twice daily.    Patient is otherwise feeling well, without additional concerns.    As noted in HPI, otherwise negative.    Labs:  None reviewed.    Physical Exam:  Vitals: There were no vitals taken for this visit.  SKIN: Focused examination of trunk, upper and lower extremities was performed.  - [place]well shaped erythematous plaques with fine scale on the trunk and extremities  - [place]well shaped hyperpigmented macules on the trunk and extremities  - No other lesions of concern on areas examined.     Medications:  Current Outpatient Medications   Medication    mometasone (ELOCON) 0.1 % external ointment    " amphetamine-dextroamphetamine (ADDERALL XR) 20 MG 24 hr capsule    lisinopril (ZESTRIL) 5 MG tablet    metoprolol succinate ER (TOPROL XL) 100 MG 24 hr tablet     No current facility-administered medications for this visit.      Past Medical/Surgical History:   Patient Active Problem List   Diagnosis    Esophageal reflux    Allergic Rhinitis    ADHD, predominantly inattentive type    Panic attack    Anxiety    Benign familial tremor    Controlled substance agreement signed    Primary hypertension    Hypercholesterolemia    Rash     Past Medical History:   Diagnosis Date    Allergic rhinitis, cause unspecified     Created by Conversion     Esophageal reflux     Created by Conversion        CC Catarina Mcfadden MD  [address] [place] [address] on close of this encounter.

## 2023-05-24 ENCOUNTER — MYC REFILL (OUTPATIENT)
Dept: FAMILY MEDICINE | Facility: CLINIC | Age: 43
End: 2023-05-24
Payer: COMMERCIAL

## 2023-05-24 DIAGNOSIS — F90.0 ADHD, PREDOMINANTLY INATTENTIVE TYPE: ICD-10-CM

## 2023-05-26 ENCOUNTER — TELEPHONE (OUTPATIENT)
Dept: FAMILY MEDICINE | Facility: CLINIC | Age: 43
End: 2023-05-26

## 2023-05-26 RX ORDER — DEXTROAMPHETAMINE SACCHARATE, AMPHETAMINE ASPARTATE MONOHYDRATE, DEXTROAMPHETAMINE SULFATE AND AMPHETAMINE SULFATE 5; 5; 5; 5 MG/1; MG/1; MG/1; MG/1
20 CAPSULE, EXTENDED RELEASE ORAL DAILY
Qty: 30 CAPSULE | Refills: 0 | Status: SHIPPED | OUTPATIENT
Start: 2023-05-26 | End: 2023-06-30

## 2023-05-26 NOTE — TELEPHONE ENCOUNTER
Patient Quality Outreach    Patient is due for the following:   Hypertension -  BP check    Next Steps:   Patient declined follow up at this time.    Type of outreach:    Phone, spoke to patient/parent. He will send home BP reading to us another time as he is not home now. xl      Questions for provider review:    None           Jamie Reid  Chart routed to Care Team.

## 2023-06-01 ENCOUNTER — MYC REFILL (OUTPATIENT)
Dept: FAMILY MEDICINE | Facility: CLINIC | Age: 43
End: 2023-06-01
Payer: COMMERCIAL

## 2023-06-01 DIAGNOSIS — F90.0 ADHD, PREDOMINANTLY INATTENTIVE TYPE: ICD-10-CM

## 2023-06-02 RX ORDER — DEXTROAMPHETAMINE SACCHARATE, AMPHETAMINE ASPARTATE MONOHYDRATE, DEXTROAMPHETAMINE SULFATE AND AMPHETAMINE SULFATE 5; 5; 5; 5 MG/1; MG/1; MG/1; MG/1
20 CAPSULE, EXTENDED RELEASE ORAL DAILY
Qty: 30 CAPSULE | Refills: 0 | OUTPATIENT
Start: 2023-06-02

## 2023-06-02 NOTE — TELEPHONE ENCOUNTER
Refill too soon.      Disp Refills Start End SONIA   amphetamine-dextroamphetamine (ADDERALL XR) 20 MG 24 hr capsule 30 capsule 0 5/26/2023  No   Sig - Route: Take 1 capsule (20 mg) by mouth daily - Oral         Requested Prescriptions   Pending Prescriptions Disp Refills     amphetamine-dextroamphetamine (ADDERALL XR) 20 MG 24 hr capsule 30 capsule 0     Sig: Take 1 capsule (20 mg) by mouth daily       There is no refill protocol information for this order          Dayna Davis RN 06/02/23 11:54 AM

## 2023-06-12 DIAGNOSIS — I10 ESSENTIAL HYPERTENSION: ICD-10-CM

## 2023-06-12 RX ORDER — METOPROLOL SUCCINATE 100 MG/1
TABLET, EXTENDED RELEASE ORAL
Qty: 90 TABLET | Refills: 0 | Status: SHIPPED | OUTPATIENT
Start: 2023-06-12 | End: 2023-09-10

## 2023-06-12 NOTE — TELEPHONE ENCOUNTER
"Routing refill request to provider for review/approval because:  BP failed.  143/91 on 5/2/2023    Last Written Prescription Date: 6/21/2022  Last Fill Quantity: 90,  # refills: 3   Last office visit provider: 5/2/2023    Requested Prescriptions   Pending Prescriptions Disp Refills     metoprolol succinate ER (TOPROL XL) 100 MG 24 hr tablet [Pharmacy Med Name: METOPROLOL ER SUCCINATE 100MG TABS] 90 tablet 3     Sig: TAKE 1 TABLET(100 MG) BY MOUTH DAILY       Beta-Blockers Protocol Failed - 6/12/2023  1:45 PM        Failed - Blood pressure under 140/90 in past 12 months     BP Readings from Last 3 Encounters:   05/02/23 (!) 143/91   01/19/23 138/88   03/10/22 (!) 142/90                 Passed - Patient is age 6 or older        Passed - Recent (12 mo) or future (30 days) visit within the authorizing provider's specialty     Patient has had an office visit with the authorizing provider or a provider within the authorizing providers department within the previous 12 mos or has a future within next 30 days. See \"Patient Info\" tab in inbasket, or \"Choose Columns\" in Meds & Orders section of the refill encounter.              Passed - Medication is active on med list             Pola Simental RN 06/12/23 1:45 PM  "

## 2023-06-13 NOTE — TELEPHONE ENCOUNTER
Left message to call back for: patient x1  Information to relay to patient: Ask provider question below and record.

## 2023-06-28 NOTE — TELEPHONE ENCOUNTER
Home BP reply back from pt via my-chart on 6/16/23 with BP reading of 131/87 updated in pt's encounter today. xl    Next appointment scheduled on 8/8/23 with Dr. Mcfadden.

## 2023-06-30 ENCOUNTER — MYC REFILL (OUTPATIENT)
Dept: FAMILY MEDICINE | Facility: CLINIC | Age: 43
End: 2023-06-30
Payer: COMMERCIAL

## 2023-06-30 DIAGNOSIS — F90.0 ADHD, PREDOMINANTLY INATTENTIVE TYPE: ICD-10-CM

## 2023-06-30 RX ORDER — DEXTROAMPHETAMINE SACCHARATE, AMPHETAMINE ASPARTATE MONOHYDRATE, DEXTROAMPHETAMINE SULFATE AND AMPHETAMINE SULFATE 5; 5; 5; 5 MG/1; MG/1; MG/1; MG/1
20 CAPSULE, EXTENDED RELEASE ORAL DAILY
Qty: 30 CAPSULE | Refills: 0 | Status: SHIPPED | OUTPATIENT
Start: 2023-06-30 | End: 2023-07-06

## 2023-07-06 ENCOUNTER — MYC MEDICAL ADVICE (OUTPATIENT)
Dept: FAMILY MEDICINE | Facility: CLINIC | Age: 43
End: 2023-07-06
Payer: COMMERCIAL

## 2023-07-06 DIAGNOSIS — F90.0 ADHD, PREDOMINANTLY INATTENTIVE TYPE: ICD-10-CM

## 2023-07-06 RX ORDER — DEXTROAMPHETAMINE SACCHARATE, AMPHETAMINE ASPARTATE MONOHYDRATE, DEXTROAMPHETAMINE SULFATE AND AMPHETAMINE SULFATE 5; 5; 5; 5 MG/1; MG/1; MG/1; MG/1
20 CAPSULE, EXTENDED RELEASE ORAL DAILY
Qty: 30 CAPSULE | Refills: 0 | Status: SHIPPED | OUTPATIENT
Start: 2023-07-06 | End: 2023-08-02

## 2023-08-02 ENCOUNTER — MYC REFILL (OUTPATIENT)
Dept: FAMILY MEDICINE | Facility: CLINIC | Age: 43
End: 2023-08-02
Payer: COMMERCIAL

## 2023-08-02 DIAGNOSIS — F90.0 ADHD, PREDOMINANTLY INATTENTIVE TYPE: ICD-10-CM

## 2023-08-03 RX ORDER — DEXTROAMPHETAMINE SACCHARATE, AMPHETAMINE ASPARTATE MONOHYDRATE, DEXTROAMPHETAMINE SULFATE AND AMPHETAMINE SULFATE 5; 5; 5; 5 MG/1; MG/1; MG/1; MG/1
20 CAPSULE, EXTENDED RELEASE ORAL DAILY
Qty: 30 CAPSULE | Refills: 0 | Status: SHIPPED | OUTPATIENT
Start: 2023-08-03 | End: 2023-09-01

## 2023-09-01 ENCOUNTER — MYC REFILL (OUTPATIENT)
Dept: FAMILY MEDICINE | Facility: CLINIC | Age: 43
End: 2023-09-01
Payer: COMMERCIAL

## 2023-09-01 DIAGNOSIS — F90.0 ADHD, PREDOMINANTLY INATTENTIVE TYPE: ICD-10-CM

## 2023-09-01 RX ORDER — DEXTROAMPHETAMINE SACCHARATE, AMPHETAMINE ASPARTATE MONOHYDRATE, DEXTROAMPHETAMINE SULFATE AND AMPHETAMINE SULFATE 5; 5; 5; 5 MG/1; MG/1; MG/1; MG/1
20 CAPSULE, EXTENDED RELEASE ORAL DAILY
Qty: 90 CAPSULE | Refills: 0 | Status: SHIPPED | OUTPATIENT
Start: 2023-09-01 | End: 2023-11-01

## 2023-09-01 NOTE — TELEPHONE ENCOUNTER
Please let pt know I am leaving.  I will try to rx a 3 month supply of his meds.  He will need to establish with a new provider for future refills so I recommend getting appointment scheduled now.

## 2023-09-10 DIAGNOSIS — I10 ESSENTIAL HYPERTENSION: ICD-10-CM

## 2023-09-10 RX ORDER — METOPROLOL SUCCINATE 100 MG/1
TABLET, EXTENDED RELEASE ORAL
Qty: 90 TABLET | Refills: 0 | Status: SHIPPED | OUTPATIENT
Start: 2023-09-10 | End: 2023-11-01

## 2023-09-10 NOTE — TELEPHONE ENCOUNTER
"Routing refill request to provider for review/approval because:  B/P out side of range    Last Written Prescription Date:  6/12/23  Last Fill Quantity: 90,  # refills: 0   Last office visit provider:  5/2/23     Requested Prescriptions   Pending Prescriptions Disp Refills    metoprolol succinate ER (TOPROL XL) 100 MG 24 hr tablet [Pharmacy Med Name: METOPROLOL ER SUCCINATE 100MG TABS] 90 tablet 0     Sig: TAKE 1 TABLET(100 MG) BY MOUTH DAILY       Beta-Blockers Protocol Failed - 9/10/2023  7:54 AM        Failed - Blood pressure under 140/90 in past 12 months     BP Readings from Last 3 Encounters:   05/02/23 (!) 143/91   01/19/23 138/88   03/10/22 (!) 142/90                 Passed - Patient is age 6 or older        Passed - Recent (12 mo) or future (30 days) visit within the authorizing provider's specialty     Patient has had an office visit with the authorizing provider or a provider within the authorizing providers department within the previous 12 mos or has a future within next 30 days. See \"Patient Info\" tab in inbasket, or \"Choose Columns\" in Meds & Orders section of the refill encounter.              Passed - Medication is active on med list             Cheyenne Saleem RN 09/10/23 7:54 AM  "

## 2023-11-01 ENCOUNTER — OFFICE VISIT (OUTPATIENT)
Dept: INTERNAL MEDICINE | Facility: CLINIC | Age: 43
End: 2023-11-01
Payer: COMMERCIAL

## 2023-11-01 VITALS
OXYGEN SATURATION: 99 % | WEIGHT: 177.4 LBS | SYSTOLIC BLOOD PRESSURE: 153 MMHG | HEART RATE: 66 BPM | BODY MASS INDEX: 25.4 KG/M2 | DIASTOLIC BLOOD PRESSURE: 97 MMHG | RESPIRATION RATE: 16 BRPM | TEMPERATURE: 98.2 F | HEIGHT: 70 IN

## 2023-11-01 DIAGNOSIS — F90.0 ADHD, PREDOMINANTLY INATTENTIVE TYPE: ICD-10-CM

## 2023-11-01 DIAGNOSIS — E78.5 HYPERLIPIDEMIA LDL GOAL <100: Primary | ICD-10-CM

## 2023-11-01 DIAGNOSIS — I10 ESSENTIAL HYPERTENSION: ICD-10-CM

## 2023-11-01 PROBLEM — E78.00 HYPERCHOLESTEROLEMIA: Status: RESOLVED | Noted: 2022-03-11 | Resolved: 2023-11-01

## 2023-11-01 PROCEDURE — 99396 PREV VISIT EST AGE 40-64: CPT | Performed by: INTERNAL MEDICINE

## 2023-11-01 PROCEDURE — 99214 OFFICE O/P EST MOD 30 MIN: CPT | Mod: 25 | Performed by: INTERNAL MEDICINE

## 2023-11-01 RX ORDER — LISINOPRIL 5 MG/1
5 TABLET ORAL DAILY
Qty: 90 TABLET | Refills: 3 | Status: SHIPPED | OUTPATIENT
Start: 2023-11-01

## 2023-11-01 RX ORDER — METOPROLOL SUCCINATE 100 MG/1
100 TABLET, EXTENDED RELEASE ORAL DAILY
Qty: 90 TABLET | Refills: 3 | Status: SHIPPED | OUTPATIENT
Start: 2023-11-01

## 2023-11-01 RX ORDER — DEXTROAMPHETAMINE SACCHARATE, AMPHETAMINE ASPARTATE MONOHYDRATE, DEXTROAMPHETAMINE SULFATE AND AMPHETAMINE SULFATE 5; 5; 5; 5 MG/1; MG/1; MG/1; MG/1
20 CAPSULE, EXTENDED RELEASE ORAL DAILY
Qty: 90 CAPSULE | Refills: 0 | Status: SHIPPED | OUTPATIENT
Start: 2023-11-01 | End: 2024-02-28

## 2023-11-01 ASSESSMENT — ENCOUNTER SYMPTOMS
MYALGIAS: 0
FEVER: 0
HEMATOCHEZIA: 0
ARTHRALGIAS: 0
HEARTBURN: 0
DIZZINESS: 0
WEAKNESS: 0
COUGH: 0
HEMATURIA: 0
PARESTHESIAS: 0
FREQUENCY: 0
JOINT SWELLING: 0
SHORTNESS OF BREATH: 0
CHILLS: 0
NAUSEA: 0
NERVOUS/ANXIOUS: 0
SORE THROAT: 0
CONSTIPATION: 0
EYE PAIN: 0
DIARRHEA: 0
PALPITATIONS: 0
HEADACHES: 0
DYSURIA: 0
ABDOMINAL PAIN: 0

## 2023-11-01 NOTE — PROGRESS NOTES
SUBJECTIVE:   CC: Kelvin is an 43 year old who presents for preventative health visit.     HPI:  overall stable, feels well.  Not having unusual dyspnea or cough. He tolerates physical activity well.  No bowel or bladder issues. Taking his hypertension medication, and he feels his ADHD treatment is going satisfactorily.         11/1/2023     2:25 PM   Additional Questions   Roomed by kusum         11/1/2023     2:25 PM   Patient Reported Additional Medications   Patient reports taking the following new medications no     Healthy Habits:     Getting at least 3 servings of Calcium per day:  Yes    Bi-annual eye exam:  NO    Dental care twice a year:  Yes    Sleep apnea or symptoms of sleep apnea:  Excessive snoring    Diet:  Low salt    Frequency of exercise:  2-3 days/week    Duration of exercise:  15-30 minutes    Taking medications regularly:  Yes    Medication side effects:  None    Additional concerns today:  No    Social History     Tobacco Use    Smoking status: Never    Smokeless tobacco: Never   Substance Use Topics    Alcohol use: Yes         11/1/2023     2:17 PM   Alcohol Use   Prescreen: >3 drinks/day or >7 drinks/week? No     Reviewed orders with patient. Reviewed health maintenance and updated orders accordingly - Yes    Reviewed and updated as needed this visit by clinical staff  Tobacco  Allergies  Meds          Reviewed and updated as needed this visit by Provider     Past Medical History:   Diagnosis Date    ADHD (attention deficit hyperactivity disorder)     Allergic rhinitis, cause unspecified     Created by Conversion     Esophageal reflux     Created by Conversion     Essential hypertension     Family history of colon cancer     Nummular eczema       Past Surgical History:   Procedure Laterality Date    WISDOM TOOTH EXTRACTION       Review of Systems   Constitutional:  Negative for chills and fever.   HENT:  Negative for congestion, ear pain, hearing loss and sore throat.    Eyes:  Negative  "for pain and visual disturbance.   Respiratory:  Negative for cough and shortness of breath.    Cardiovascular:  Negative for chest pain, palpitations and peripheral edema.   Gastrointestinal:  Negative for abdominal pain, constipation, diarrhea, heartburn, hematochezia and nausea.   Genitourinary:  Negative for dysuria, frequency, genital sores, hematuria and urgency.   Musculoskeletal:  Negative for arthralgias, joint swelling and myalgias.   Skin:  Negative for rash.   Neurological:  Negative for dizziness, weakness, headaches and paresthesias.   Psychiatric/Behavioral:  Negative for mood changes. The patient is not nervous/anxious.      OBJECTIVE:     PHYSICAL EXAM:  General Appearance: In no acute distress  BP (!) 153/97   Pulse 66   Temp 98.2  F (36.8  C) (Oral)   Resp 16   Ht 1.778 m (5' 10\")   Wt 80.5 kg (177 lb 6.4 oz)   SpO2 99%   BMI 25.45 kg/m    EYES: Clear   HEENT: nose and throat clear, ears normal   NECK:  without cervical or axillary adenopathy  RESPIRATORY: Clear   CARDIOVASCULAR: S1, S2  ABDOMEN: soft, flat, and non-tender  EXTREMITIES: No joint swelling, no  edema  NEUROLOGIC: Speech is clear, gait is normal  PSYCHIATRIC: Oriented X 3,  thinking is clear     ASSESSMENT/PLAN:     Kelvin was seen today for physical and recheck medication.    Diagnoses and all orders for this visit:    Hyperlipidemia LDL goal <100  LDL cholesterol has been elevated in the past - 173 1/2023,  risk benefit ratio makes treatment optional at this time.  We discussed diet, and risks.Will continue to follow.      Essential hypertension  Satisfactory control with current treatment. I get 140/70 left arm  -     metoprolol succinate ER (TOPROL XL) 100 MG 24 hr tablet; Take 1 tablet (100 mg) by mouth daily  -     lisinopril (ZESTRIL) 5 MG tablet; Take 1 tablet (5 mg) by mouth daily    ADHD, predominantly inattentive type  -     amphetamine-dextroamphetamine (ADDERALL XR) 20 MG 24 hr capsule; Take 1 capsule (20 mg) by " "mouth daily    Patient has been advised of split billing requirements and indicates understanding: Yes    COUNSELING:   Reviewed preventive health counseling, as reflected in patient instructions       Regular exercise       Healthy diet/nutrition       Colorectal cancer screening    BMI:   Estimated body mass index is 25.45 kg/m  as calculated from the following:    Height as of this encounter: 1.778 m (5' 10\").    Weight as of this encounter: 80.5 kg (177 lb 6.4 oz).     He reports that he has never smoked. He has never used smokeless tobacco.    Los Garcia MD  [facility]  "

## 2024-01-01 NOTE — TELEPHONE ENCOUNTER
Central Prior Authorization Team   Phone: [phone]    PA Initiation    Medication: adderall  Insurance Company: [employer] ([facility]) - Phone [phone] Fax [phone]  Pharmacy Filling the Rx: [id number] - [place], [place] - [address] AT [facility] [address]  Filling Pharmacy Phone: [phone]  Filling Pharmacy Fax:    Start Date: 1/10/2022            
Prior Authorization Approval    Authorization Effective Date: 1/10/2022  Authorization Expiration Date: 1/10/2023  Medication: adderall  Approved Dose/Quantity:   Reference #:     Insurance Company: [employer] ([facility]) - Phone [phone] Fax [phone]  Expected CoPay:       CoPay Card Available:      Foundation Assistance Needed:    Which Pharmacy is filling the prescription (Not needed for infusion/clinic administered): [id number] - [place], [place] - [address] AT [facility] [address]  Pharmacy Notified: Yes  Patient Notified: Yes    
Prior Authorization Request  Who s requesting:  Pharmacy  Pharmacy Name and Location: [facility] [address]  Medication Name: adderall  Insurance Plan: select care  Insurance Member ID Number:  [id number]  CoverMyMeds Key: n/a  Informed patient that prior authorizations can take up to 10 business days for response:   Yes  Okay to leave a detailed message: Yes     Route to pool:  P FMG PA MED    Pharm called states PA needed per insurance for everyone over age of 18. Pt is out of meds please process asap please    
-Limit visiting for 8 weeks and avoid public places.

## 2024-02-28 ENCOUNTER — MYC REFILL (OUTPATIENT)
Dept: INTERNAL MEDICINE | Facility: CLINIC | Age: 44
End: 2024-02-28
Payer: COMMERCIAL

## 2024-02-28 DIAGNOSIS — F90.0 ADHD, PREDOMINANTLY INATTENTIVE TYPE: ICD-10-CM

## 2024-02-29 RX ORDER — DEXTROAMPHETAMINE SACCHARATE, AMPHETAMINE ASPARTATE MONOHYDRATE, DEXTROAMPHETAMINE SULFATE AND AMPHETAMINE SULFATE 5; 5; 5; 5 MG/1; MG/1; MG/1; MG/1
20 CAPSULE, EXTENDED RELEASE ORAL DAILY
Qty: 90 CAPSULE | Refills: 0 | Status: SHIPPED | OUTPATIENT
Start: 2024-02-29 | End: 2024-03-25

## 2024-02-29 NOTE — TELEPHONE ENCOUNTER
Patient last seen by Dr. Garcia. Previous PCP no longer with system. Will forward to him to advise on fill.

## 2024-03-25 ENCOUNTER — MYC REFILL (OUTPATIENT)
Dept: INTERNAL MEDICINE | Facility: CLINIC | Age: 44
End: 2024-03-25
Payer: COMMERCIAL

## 2024-03-25 DIAGNOSIS — F90.0 ADHD, PREDOMINANTLY INATTENTIVE TYPE: ICD-10-CM

## 2024-03-27 ENCOUNTER — MYC MEDICAL ADVICE (OUTPATIENT)
Dept: INTERNAL MEDICINE | Facility: CLINIC | Age: 44
End: 2024-03-27
Payer: COMMERCIAL

## 2024-03-27 RX ORDER — DEXTROAMPHETAMINE SACCHARATE, AMPHETAMINE ASPARTATE MONOHYDRATE, DEXTROAMPHETAMINE SULFATE AND AMPHETAMINE SULFATE 5; 5; 5; 5 MG/1; MG/1; MG/1; MG/1
20 CAPSULE, EXTENDED RELEASE ORAL DAILY
Qty: 90 CAPSULE | Refills: 0 | Status: SHIPPED | OUTPATIENT
Start: 2024-03-27 | End: 2024-06-19

## 2024-04-17 NOTE — PATIENT INSTRUCTIONS
Increase metoprolol to 100 mg daily  Check blood pressures with home cuff and send me My Chart message in 2 weeks    For wound:  Keep bandage on until tomorrow.  OK to shower  Dress with bandage and antibiotic ointment until healed over then can stop  Call if any redness / worsening pain / discharge    Follow- up in 6 months  
None

## 2024-06-19 ENCOUNTER — MYC REFILL (OUTPATIENT)
Dept: INTERNAL MEDICINE | Facility: CLINIC | Age: 44
End: 2024-06-19
Payer: COMMERCIAL

## 2024-06-19 DIAGNOSIS — F90.0 ADHD, PREDOMINANTLY INATTENTIVE TYPE: ICD-10-CM

## 2024-06-20 RX ORDER — DEXTROAMPHETAMINE SACCHARATE, AMPHETAMINE ASPARTATE MONOHYDRATE, DEXTROAMPHETAMINE SULFATE AND AMPHETAMINE SULFATE 5; 5; 5; 5 MG/1; MG/1; MG/1; MG/1
20 CAPSULE, EXTENDED RELEASE ORAL DAILY
Qty: 90 CAPSULE | Refills: 0 | Status: SHIPPED | OUTPATIENT
Start: 2024-06-20 | End: 2024-07-11

## 2024-07-11 ENCOUNTER — MYC REFILL (OUTPATIENT)
Dept: INTERNAL MEDICINE | Facility: CLINIC | Age: 44
End: 2024-07-11

## 2024-07-11 DIAGNOSIS — F90.0 ADHD, PREDOMINANTLY INATTENTIVE TYPE: ICD-10-CM

## 2024-07-11 RX ORDER — DEXTROAMPHETAMINE SACCHARATE, AMPHETAMINE ASPARTATE MONOHYDRATE, DEXTROAMPHETAMINE SULFATE AND AMPHETAMINE SULFATE 5; 5; 5; 5 MG/1; MG/1; MG/1; MG/1
20 CAPSULE, EXTENDED RELEASE ORAL DAILY
Qty: 90 CAPSULE | Refills: 0 | Status: SHIPPED | OUTPATIENT
Start: 2024-07-11

## 2024-08-17 ENCOUNTER — OFFICE VISIT (OUTPATIENT)
Dept: URGENT CARE | Facility: URGENT CARE | Age: 44
End: 2024-08-17
Payer: COMMERCIAL

## 2024-08-17 VITALS
BODY MASS INDEX: 25.4 KG/M2 | SYSTOLIC BLOOD PRESSURE: 149 MMHG | HEART RATE: 73 BPM | WEIGHT: 177 LBS | TEMPERATURE: 99.9 F | OXYGEN SATURATION: 98 % | DIASTOLIC BLOOD PRESSURE: 99 MMHG

## 2024-08-17 DIAGNOSIS — Z20.818 EXPOSURE TO STREP THROAT: ICD-10-CM

## 2024-08-17 DIAGNOSIS — J01.00 ACUTE MAXILLARY SINUSITIS, RECURRENCE NOT SPECIFIED: Primary | ICD-10-CM

## 2024-08-17 LAB — DEPRECATED S PYO AG THROAT QL EIA: NEGATIVE

## 2024-08-17 PROCEDURE — 87651 STREP A DNA AMP PROBE: CPT | Performed by: PHYSICIAN ASSISTANT

## 2024-08-17 PROCEDURE — 99213 OFFICE O/P EST LOW 20 MIN: CPT | Performed by: PHYSICIAN ASSISTANT

## 2024-08-17 ASSESSMENT — ENCOUNTER SYMPTOMS
SINUS PRESSURE: 1
SINUS PAIN: 1
COUGH: 1

## 2024-08-17 ASSESSMENT — PAIN SCALES - GENERAL: PAINLEVEL: SEVERE PAIN (6)

## 2024-08-17 NOTE — PROGRESS NOTES
Assessment & Plan       1. Acute maxillary sinusitis, recurrence not specified    - amoxicillin-clavulanate (AUGMENTIN) 875-125 MG tablet; Take 1 tablet by mouth 2 times daily for 7 days  Dispense: 14 tablet; Refill: 0    2. Exposure to strep throat    -Rapid strep (-)  - Streptococcus A Rapid Screen w/Reflex to PCR - Clinic Collect  - Group A Streptococcus PCR Throat Swab  Results for orders placed or performed in visit on 08/17/24   Streptococcus A Rapid Screen w/Reflex to PCR - Clinic Collect     Status: Normal    Specimen: Throat; Swab   Result Value Ref Range    Group A Strep antigen Negative Negative       Patient Instructions   Drink plenty of fluids, rest, warm compresses on face  Netti Pot 1x in the morning 1x at night  or saline rinses or saline nasal spray such as Oceans spray  Flonase (Fluticasone) 2x each nostril twice a day for two weeks, then once each nostril once a day until symptoms resolved                     Return if symptoms worsen or fail to improve, for Follow up.    At the end of the encounter, I discussed results, diagnosis, medications. Discussed red flags for immediate return to clinic/ER, as well as indications for follow up if no improvement. Patient understood and agreed to plan. Patient was stable for discharge.    Jeff Warren is a 43 year old male who presents to clinic today  for the following health issues:  Chief Complaint   Patient presents with    Urgent Care     Neg to covid     Sinus Problem     X's 5 days facial pressure mostly at night     Nasal Congestion    Sweats    Cough     HPI    Patient reports sinus symptoms for 8 days.  Worsening for the past 3 days.  He has a history of allergies.  He uses saline nasal rinses and Flonase every day.  No history of sinus infections in the past 3 months.  He also reports exposure to strep.  No fever or chills    Review of Systems   HENT:  Positive for congestion, sinus pressure and sinus pain.    Respiratory:  Positive for  cough.        Problem List:  2023-05: Rash  2022-03: Primary hypertension  2022-03: Hyperlipidemia LDL goal <100  2016-04: Controlled substance agreement signed  2016-03: ADHD (attention deficit hyperactivity disorder)  2016-03: Panic attack  2016-03: Anxiety  2016-03: Benign familial tremor  Esophageal reflux  Allergic Rhinitis      Past Medical History:   Diagnosis Date    ADHD (attention deficit hyperactivity disorder)     Allergic rhinitis, cause unspecified     Created by Conversion     Esophageal reflux     Created by Conversion     Essential hypertension     Family history of colon cancer     Nummular eczema     Vitamin D deficiency        Social History     Tobacco Use    Smoking status: Never    Smokeless tobacco: Never   Substance Use Topics    Alcohol use: Yes           Objective    BP (!) 149/99   Pulse 73   Temp 99.9  F (37.7  C) (Oral)   Wt 80.3 kg (177 lb)   SpO2 98%   BMI 25.40 kg/m    Physical Exam  Constitutional:       Appearance: Normal appearance.   HENT:      Head: Normocephalic.      Right Ear: Tympanic membrane normal.      Left Ear: Tympanic membrane normal.      Nose: Congestion present.      Right Sinus: Maxillary sinus tenderness present. No frontal sinus tenderness.      Left Sinus: Maxillary sinus tenderness present. No frontal sinus tenderness.      Mouth/Throat:      Mouth: Mucous membranes are moist.      Pharynx: Uvula midline. Posterior oropharyngeal erythema present.   Cardiovascular:      Rate and Rhythm: Normal rate and regular rhythm.   Pulmonary:      Effort: Pulmonary effort is normal.      Breath sounds: Normal breath sounds.   Lymphadenopathy:      Head:      Right side of head: No submental, submandibular or tonsillar adenopathy.      Left side of head: No submental, submandibular or tonsillar adenopathy.      Cervical: No cervical adenopathy.      Right cervical: No superficial cervical adenopathy.     Left cervical: No superficial cervical adenopathy.   Skin:      General: Skin is warm and dry.      Findings: No rash.   Neurological:      Mental Status: He is alert.   Psychiatric:         Mood and Affect: Mood normal.         Behavior: Behavior normal.              Karin Godoy PA-C

## 2024-08-18 LAB — GROUP A STREP BY PCR: NOT DETECTED

## 2024-08-18 NOTE — PATIENT INSTRUCTIONS
Drink plenty of fluids, rest, warm compresses on face  Netti Pot 1x in the morning 1x at night  or saline rinses or saline nasal spray such as Oceans spray  Flonase (Fluticasone) 2x each nostril twice a day for two weeks, then once each nostril once a day until symptoms resolved

## 2024-10-11 ENCOUNTER — MYC REFILL (OUTPATIENT)
Dept: INTERNAL MEDICINE | Facility: CLINIC | Age: 44
End: 2024-10-11
Payer: COMMERCIAL

## 2024-10-11 DIAGNOSIS — F90.0 ADHD, PREDOMINANTLY INATTENTIVE TYPE: ICD-10-CM

## 2024-10-12 RX ORDER — DEXTROAMPHETAMINE SACCHARATE, AMPHETAMINE ASPARTATE MONOHYDRATE, DEXTROAMPHETAMINE SULFATE AND AMPHETAMINE SULFATE 5; 5; 5; 5 MG/1; MG/1; MG/1; MG/1
20 CAPSULE, EXTENDED RELEASE ORAL DAILY
Qty: 90 CAPSULE | Refills: 0 | Status: SHIPPED | OUTPATIENT
Start: 2024-10-12

## 2024-11-27 DIAGNOSIS — I10 ESSENTIAL HYPERTENSION: ICD-10-CM

## 2024-11-27 RX ORDER — LISINOPRIL 5 MG/1
5 TABLET ORAL DAILY
Qty: 90 TABLET | Refills: 3 | Status: SHIPPED | OUTPATIENT
Start: 2024-11-27

## 2024-11-27 RX ORDER — METOPROLOL SUCCINATE 100 MG/1
100 TABLET, EXTENDED RELEASE ORAL DAILY
Qty: 90 TABLET | Refills: 3 | Status: SHIPPED | OUTPATIENT
Start: 2024-11-27

## 2024-12-04 ENCOUNTER — MYC REFILL (OUTPATIENT)
Dept: INTERNAL MEDICINE | Facility: CLINIC | Age: 44
End: 2024-12-04
Payer: COMMERCIAL

## 2024-12-04 DIAGNOSIS — I10 ESSENTIAL HYPERTENSION: ICD-10-CM

## 2024-12-04 RX ORDER — METOPROLOL SUCCINATE 100 MG/1
100 TABLET, EXTENDED RELEASE ORAL DAILY
Qty: 90 TABLET | Refills: 3 | OUTPATIENT
Start: 2024-12-04

## 2025-01-04 ENCOUNTER — HEALTH MAINTENANCE LETTER (OUTPATIENT)
Age: 45
End: 2025-01-04

## 2025-03-06 ENCOUNTER — OFFICE VISIT (OUTPATIENT)
Dept: INTERNAL MEDICINE | Facility: CLINIC | Age: 45
End: 2025-03-06
Payer: COMMERCIAL

## 2025-03-06 VITALS
HEIGHT: 70 IN | RESPIRATION RATE: 14 BRPM | BODY MASS INDEX: 25.34 KG/M2 | DIASTOLIC BLOOD PRESSURE: 88 MMHG | TEMPERATURE: 100.2 F | HEART RATE: 95 BPM | WEIGHT: 177 LBS | SYSTOLIC BLOOD PRESSURE: 140 MMHG | OXYGEN SATURATION: 98 %

## 2025-03-06 DIAGNOSIS — I10 ESSENTIAL HYPERTENSION: Primary | ICD-10-CM

## 2025-03-06 DIAGNOSIS — L72.3 SEBACEOUS CYST: ICD-10-CM

## 2025-03-06 DIAGNOSIS — K64.4 EXTERNAL HEMORRHOIDS: ICD-10-CM

## 2025-03-06 DIAGNOSIS — N20.0 NEPHROLITHIASIS: ICD-10-CM

## 2025-03-06 ASSESSMENT — PAIN SCALES - GENERAL: PAINLEVEL_OUTOF10: NO PAIN (0)

## 2025-03-06 NOTE — PROGRESS NOTES
" ASSESSMENT AND PLAN:    1. Essential hypertension   On repeat I get 138/84 will follow on current treatment.     2. Sebaceous cyst  Mid posterior, lower neck, now post drainage and scabbed.  No fluctuance.  Should resolve, reassured.     3. Nephrolithiasis  Recent symptomatic right distal ureter 2mm stone, clinically no longer causing symptoms.  Will follow and repeat US if further symptoms. Discussed maintaining hydration..     4. External hemorrhoids  Recent tender, thrombosed hemorrhoid, some bleeding \"it burst\" and now asymptomatic.      Follow up 6 months with preventive visit.     CHIEF COMPLAINT:  Sebaceous cyst, recent kidney stone, ER visit, hemorrhoids.     HISTORY OF PRESENT ILLNESS:  Claudio Thacker is a 44 year old male now feels well.  Cyst on lower mid posterior neck has drained and doesn't hurt.  No further testical and flank pain, the stone has likely passed.  Hemorrhoid thrombosed and was tender, now better.  Overall feels OK.  Alcohol use is moderate 8 per week.     REVIEW OF SYSTEMS:   See HPI, all other systems on review are negative.    Past Medical History:   Diagnosis Date    ADHD (attention deficit hyperactivity disorder)     Allergic rhinitis, cause unspecified     Created by Conversion     Esophageal reflux     Created by Conversion     Essential hypertension     Family history of colon cancer     Nummular eczema     Vitamin D deficiency      History   Smoking Status    Never   Smokeless Tobacco    Never     Family History   Problem Relation Age of Onset    Colon Cancer Mother     Hypertension Father     Tremor Father     Tremor Brother     Hypertension Brother     Skin Cancer No family hx of      Past Surgical History:   Procedure Laterality Date    WISDOM TOOTH EXTRACTION       Allergies   Allergen Reactions    Morphine And Codeine Swelling     Inflammation of the joints       VITALS:  Vitals:    03/06/25 1436   BP: (!) 140/88   BP Location: Left arm   Patient Position: Sitting   Cuff " "Size: Adult Regular   Pulse: 95   Resp: 14   Temp: 100.2  F (37.9  C)   TempSrc: Temporal   SpO2: 98%   Weight: 80.3 kg (177 lb)   Height: 1.778 m (5' 10\")     Estimated body mass index is 25.4 kg/m  as calculated from the following:    Height as of this encounter: 1.778 m (5' 10\").    Weight as of this encounter: 80.3 kg (177 lb).  Wt Readings from Last 3 Encounters:   03/06/25 80.3 kg (177 lb)   08/17/24 80.3 kg (177 lb)   11/01/23 80.5 kg (177 lb 6.4 oz)     PHYSICAL EXAM:  Constitutional:  In NAD, alert and oriented  Neck: no cervical or axillary adenopathy, small posterior lower midline, drained sebaceous cyst, not tender no erythema, scabbed over.   Psychiatric:  Mood and behavior are appropriate, thinking is clear.     DECISION TO OBTAIN OLD RECORDS AND/OR OBTAIN HISTORY FROM SOMEONE OTHER THAN PATIENT, AND/OR ACCESSING CARE EVERYWHERE):  1  reviewed ER and urgent care notes     REVIEW AND SUMMARIZATION OF OLD RECORDS, AND/OR OBTAINING HISTORY FROM SOMEONE OTHER THAN PATIENT, AND/OR DISCUSSION OF CASE WITH ANOTHER HEALTH CARE PROVIDER:  2 reviewed old health notes    REVIEW AND/OR ORDER OF OF CLINICAL LAB TESTS: 1  0.    REVIEW AND/OR ORDER OF RADIOLOGY TESTS: 1 reviewed CT abdomen.    REVIEW AND/OR ORDER OF MEDICAL TESTS (EKG/ECHO/COLONOSCOPY/EGD): 1  0    INDEPENDENT  VISUALIZATION OF IMAGE, TRACING, OR SPECIMEN ITSELF (2 EACH):  0     TOTAL: 4    Current Outpatient Medications   Medication Sig Dispense Refill    amphetamine-dextroamphetamine (ADDERALL XR) 20 MG 24 hr capsule Take 1 capsule (20 mg) by mouth daily. 90 capsule 0    lisinopril (ZESTRIL) 5 MG tablet Take 1 tablet (5 mg) by mouth daily. 90 tablet 3    metoprolol succinate ER (TOPROL XL) 100 MG 24 hr tablet Take 1 tablet (100 mg) by mouth daily. 90 tablet 3    mometasone (ELOCON) 0.1 % external ointment Apply topically 2 times daily 90 g 3     Los Garcia MD  Internal Medicine  [facility]   "

## 2025-04-01 ENCOUNTER — MYC REFILL (OUTPATIENT)
Dept: INTERNAL MEDICINE | Facility: CLINIC | Age: 45
End: 2025-04-01
Payer: COMMERCIAL

## 2025-04-01 DIAGNOSIS — F90.0 ADHD, PREDOMINANTLY INATTENTIVE TYPE: ICD-10-CM

## 2025-04-01 RX ORDER — DEXTROAMPHETAMINE SACCHARATE, AMPHETAMINE ASPARTATE MONOHYDRATE, DEXTROAMPHETAMINE SULFATE AND AMPHETAMINE SULFATE 5; 5; 5; 5 MG/1; MG/1; MG/1; MG/1
20 CAPSULE, EXTENDED RELEASE ORAL DAILY
Qty: 90 CAPSULE | Refills: 0 | Status: SHIPPED | OUTPATIENT
Start: 2025-04-01

## 2025-07-01 ENCOUNTER — MYC REFILL (OUTPATIENT)
Dept: INTERNAL MEDICINE | Facility: CLINIC | Age: 45
End: 2025-07-01
Payer: COMMERCIAL

## 2025-07-01 DIAGNOSIS — F90.0 ADHD, PREDOMINANTLY INATTENTIVE TYPE: ICD-10-CM

## 2025-07-01 RX ORDER — DEXTROAMPHETAMINE SACCHARATE, AMPHETAMINE ASPARTATE MONOHYDRATE, DEXTROAMPHETAMINE SULFATE AND AMPHETAMINE SULFATE 5; 5; 5; 5 MG/1; MG/1; MG/1; MG/1
20 CAPSULE, EXTENDED RELEASE ORAL DAILY
Qty: 90 CAPSULE | Refills: 0 | Status: SHIPPED | OUTPATIENT
Start: 2025-07-01